# Patient Record
Sex: FEMALE | Race: BLACK OR AFRICAN AMERICAN | Employment: UNEMPLOYED | URBAN - METROPOLITAN AREA
[De-identification: names, ages, dates, MRNs, and addresses within clinical notes are randomized per-mention and may not be internally consistent; named-entity substitution may affect disease eponyms.]

---

## 2024-02-10 ENCOUNTER — APPOINTMENT (OUTPATIENT)
Dept: CT IMAGING | Age: 49
End: 2024-02-10
Attending: INTERNAL MEDICINE
Payer: COMMERCIAL

## 2024-02-10 ENCOUNTER — HOSPITAL ENCOUNTER (INPATIENT)
Age: 49
LOS: 6 days | Discharge: HOME | End: 2024-02-16
Attending: INTERNAL MEDICINE | Admitting: INTERNAL MEDICINE
Payer: COMMERCIAL

## 2024-02-10 DIAGNOSIS — S81.801A WOUND OF RIGHT LOWER EXTREMITY, INITIAL ENCOUNTER: Primary | ICD-10-CM

## 2024-02-10 DIAGNOSIS — S81.801D WOUND OF RIGHT LOWER EXTREMITY, SUBSEQUENT ENCOUNTER: ICD-10-CM

## 2024-02-10 PROBLEM — D64.9 ANEMIA: Status: ACTIVE | Noted: 2024-02-10

## 2024-02-10 PROBLEM — D64.9 SEVERE ANEMIA: Status: ACTIVE | Noted: 2024-02-10

## 2024-02-10 LAB
ABO + RH BLD: NORMAL
ALBUMIN SERPL-MCNC: 3.3 G/DL (ref 3.4–5)
ALBUMIN/GLOB SERPL: 1.1 {RATIO} (ref 1.1–2.2)
ALP SERPL-CCNC: 111 U/L (ref 40–129)
ALT SERPL-CCNC: 13 U/L (ref 10–40)
AMPHETAMINES UR QL SCN>1000 NG/ML: POSITIVE
ANION GAP SERPL CALCULATED.3IONS-SCNC: 11 MMOL/L (ref 3–16)
AST SERPL-CCNC: 20 U/L (ref 15–37)
BARBITURATES UR QL SCN>200 NG/ML: ABNORMAL
BENZODIAZ UR QL SCN>200 NG/ML: ABNORMAL
BILIRUB SERPL-MCNC: 0.3 MG/DL (ref 0–1)
BILIRUB UR QL STRIP.AUTO: NEGATIVE
BLD GP AB SCN SERPL QL: NORMAL
BUN SERPL-MCNC: 11 MG/DL (ref 7–20)
CALCIUM SERPL-MCNC: 8.1 MG/DL (ref 8.3–10.6)
CANNABINOIDS UR QL SCN>50 NG/ML: ABNORMAL
CHLORIDE SERPL-SCNC: 111 MMOL/L (ref 99–110)
CLARITY UR: CLEAR
CO2 SERPL-SCNC: 19 MMOL/L (ref 21–32)
COCAINE UR QL SCN: ABNORMAL
COLOR UR: YELLOW
CREAT SERPL-MCNC: 0.8 MG/DL (ref 0.6–1.1)
DEPRECATED RDW RBC AUTO: 17.5 % (ref 12.4–15.4)
DRUG SCREEN COMMENT UR-IMP: ABNORMAL
FENTANYL SCREEN, URINE: ABNORMAL
FOLATE SERPL-MCNC: 5.13 NG/ML (ref 4.78–24.2)
GFR SERPLBLD CREATININE-BSD FMLA CKD-EPI: >60 ML/MIN/{1.73_M2}
GLUCOSE SERPL-MCNC: 93 MG/DL (ref 70–99)
GLUCOSE UR STRIP.AUTO-MCNC: NEGATIVE MG/DL
HCT VFR BLD AUTO: 26.6 % (ref 36–48)
HGB BLD-MCNC: 8.7 G/DL (ref 12–16)
HGB UR QL STRIP.AUTO: NEGATIVE
IRON SATN MFR SERPL: 8 % (ref 15–50)
IRON SERPL-MCNC: 30 UG/DL (ref 37–145)
KETONES UR STRIP.AUTO-MCNC: NEGATIVE MG/DL
LEUKOCYTE ESTERASE UR QL STRIP.AUTO: NEGATIVE
MAGNESIUM SERPL-MCNC: 1.9 MG/DL (ref 1.8–2.4)
MCH RBC QN AUTO: 24.8 PG (ref 26–34)
MCHC RBC AUTO-ENTMCNC: 32.8 G/DL (ref 31–36)
MCV RBC AUTO: 75.5 FL (ref 80–100)
METHADONE UR QL SCN>300 NG/ML: ABNORMAL
NITRITE UR QL STRIP.AUTO: NEGATIVE
OPIATES UR QL SCN>300 NG/ML: ABNORMAL
OXYCODONE UR QL SCN: ABNORMAL
PCP UR QL SCN>25 NG/ML: ABNORMAL
PH UR STRIP.AUTO: 6.5 [PH] (ref 5–8)
PH UR STRIP: 6 [PH]
PLATELET # BLD AUTO: 337 K/UL (ref 135–450)
PMV BLD AUTO: 6.7 FL (ref 5–10.5)
POTASSIUM SERPL-SCNC: 3.5 MMOL/L (ref 3.5–5.1)
PROT SERPL-MCNC: 6.4 G/DL (ref 6.4–8.2)
PROT UR STRIP.AUTO-MCNC: NEGATIVE MG/DL
RBC # BLD AUTO: 3.52 M/UL (ref 4–5.2)
SODIUM SERPL-SCNC: 141 MMOL/L (ref 136–145)
SP GR UR STRIP.AUTO: 1.02 (ref 1–1.03)
TIBC SERPL-MCNC: 399 UG/DL (ref 260–445)
UA COMPLETE W REFLEX CULTURE PNL UR: NORMAL
UA DIPSTICK W REFLEX MICRO PNL UR: NORMAL
URN SPEC COLLECT METH UR: NORMAL
UROBILINOGEN UR STRIP-ACNC: 0.2 E.U./DL
VIT B12 SERPL-MCNC: 249 PG/ML (ref 211–911)
WBC # BLD AUTO: 6 K/UL (ref 4–11)

## 2024-02-10 PROCEDURE — 86850 RBC ANTIBODY SCREEN: CPT

## 2024-02-10 PROCEDURE — 86901 BLOOD TYPING SEROLOGIC RH(D): CPT

## 2024-02-10 PROCEDURE — 85027 COMPLETE CBC AUTOMATED: CPT

## 2024-02-10 PROCEDURE — 80307 DRUG TEST PRSMV CHEM ANLYZR: CPT

## 2024-02-10 PROCEDURE — 80053 COMPREHEN METABOLIC PANEL: CPT

## 2024-02-10 PROCEDURE — 82607 VITAMIN B-12: CPT

## 2024-02-10 PROCEDURE — 83550 IRON BINDING TEST: CPT

## 2024-02-10 PROCEDURE — G0378 HOSPITAL OBSERVATION PER HR: HCPCS

## 2024-02-10 PROCEDURE — 36415 COLL VENOUS BLD VENIPUNCTURE: CPT

## 2024-02-10 PROCEDURE — 73700 CT LOWER EXTREMITY W/O DYE: CPT

## 2024-02-10 PROCEDURE — 6370000000 HC RX 637 (ALT 250 FOR IP): Performed by: INTERNAL MEDICINE

## 2024-02-10 PROCEDURE — 99222 1ST HOSP IP/OBS MODERATE 55: CPT | Performed by: SURGERY

## 2024-02-10 PROCEDURE — 2580000003 HC RX 258: Performed by: INTERNAL MEDICINE

## 2024-02-10 PROCEDURE — 83540 ASSAY OF IRON: CPT

## 2024-02-10 PROCEDURE — 1200000000 HC SEMI PRIVATE

## 2024-02-10 PROCEDURE — 86900 BLOOD TYPING SEROLOGIC ABO: CPT

## 2024-02-10 PROCEDURE — G0379 DIRECT REFER HOSPITAL OBSERV: HCPCS

## 2024-02-10 PROCEDURE — 2500000003 HC RX 250 WO HCPCS

## 2024-02-10 PROCEDURE — 81003 URINALYSIS AUTO W/O SCOPE: CPT

## 2024-02-10 PROCEDURE — 6370000000 HC RX 637 (ALT 250 FOR IP)

## 2024-02-10 PROCEDURE — 82746 ASSAY OF FOLIC ACID SERUM: CPT

## 2024-02-10 PROCEDURE — 83735 ASSAY OF MAGNESIUM: CPT

## 2024-02-10 RX ORDER — POTASSIUM CHLORIDE 7.45 MG/ML
10 INJECTION INTRAVENOUS PRN
Status: DISCONTINUED | OUTPATIENT
Start: 2024-02-10 | End: 2024-02-16 | Stop reason: HOSPADM

## 2024-02-10 RX ORDER — ACETAMINOPHEN 650 MG/1
650 SUPPOSITORY RECTAL EVERY 6 HOURS PRN
Status: DISCONTINUED | OUTPATIENT
Start: 2024-02-10 | End: 2024-02-16 | Stop reason: HOSPADM

## 2024-02-10 RX ORDER — MAGNESIUM SULFATE IN WATER 40 MG/ML
2000 INJECTION, SOLUTION INTRAVENOUS PRN
Status: DISCONTINUED | OUTPATIENT
Start: 2024-02-10 | End: 2024-02-16 | Stop reason: HOSPADM

## 2024-02-10 RX ORDER — PANTOPRAZOLE SODIUM 40 MG/1
40 TABLET, DELAYED RELEASE ORAL
Status: DISCONTINUED | OUTPATIENT
Start: 2024-02-11 | End: 2024-02-16 | Stop reason: HOSPADM

## 2024-02-10 RX ORDER — ENOXAPARIN SODIUM 100 MG/ML
40 INJECTION SUBCUTANEOUS DAILY
Status: DISCONTINUED | OUTPATIENT
Start: 2024-02-11 | End: 2024-02-16 | Stop reason: HOSPADM

## 2024-02-10 RX ORDER — POTASSIUM CHLORIDE 20 MEQ/1
40 TABLET, EXTENDED RELEASE ORAL PRN
Status: DISCONTINUED | OUTPATIENT
Start: 2024-02-10 | End: 2024-02-16 | Stop reason: HOSPADM

## 2024-02-10 RX ORDER — ONDANSETRON 2 MG/ML
4 INJECTION INTRAMUSCULAR; INTRAVENOUS EVERY 6 HOURS PRN
Status: DISCONTINUED | OUTPATIENT
Start: 2024-02-10 | End: 2024-02-16 | Stop reason: HOSPADM

## 2024-02-10 RX ORDER — SODIUM CHLORIDE 0.9 % (FLUSH) 0.9 %
5-40 SYRINGE (ML) INJECTION EVERY 12 HOURS SCHEDULED
Status: DISCONTINUED | OUTPATIENT
Start: 2024-02-10 | End: 2024-02-16 | Stop reason: HOSPADM

## 2024-02-10 RX ORDER — ACETAMINOPHEN 325 MG/1
650 TABLET ORAL EVERY 6 HOURS PRN
Status: DISCONTINUED | OUTPATIENT
Start: 2024-02-10 | End: 2024-02-16 | Stop reason: HOSPADM

## 2024-02-10 RX ORDER — SODIUM CHLORIDE 0.9 % (FLUSH) 0.9 %
5-40 SYRINGE (ML) INJECTION PRN
Status: DISCONTINUED | OUTPATIENT
Start: 2024-02-10 | End: 2024-02-16 | Stop reason: HOSPADM

## 2024-02-10 RX ORDER — POLYETHYLENE GLYCOL 3350 17 G/17G
17 POWDER, FOR SOLUTION ORAL DAILY PRN
Status: DISCONTINUED | OUTPATIENT
Start: 2024-02-10 | End: 2024-02-16 | Stop reason: HOSPADM

## 2024-02-10 RX ORDER — ONDANSETRON 4 MG/1
4 TABLET, ORALLY DISINTEGRATING ORAL EVERY 8 HOURS PRN
Status: DISCONTINUED | OUTPATIENT
Start: 2024-02-10 | End: 2024-02-16 | Stop reason: HOSPADM

## 2024-02-10 RX ORDER — LIDOCAINE HYDROCHLORIDE AND EPINEPHRINE 10; 10 MG/ML; UG/ML
20 INJECTION, SOLUTION INFILTRATION; PERINEURAL ONCE
Status: COMPLETED | OUTPATIENT
Start: 2024-02-10 | End: 2024-02-10

## 2024-02-10 RX ORDER — CEPHALEXIN 500 MG/1
500 CAPSULE ORAL ONCE
Status: DISCONTINUED | OUTPATIENT
Start: 2024-02-10 | End: 2024-02-10

## 2024-02-10 RX ORDER — CEPHALEXIN 500 MG/1
500 CAPSULE ORAL EVERY 6 HOURS SCHEDULED
Status: DISCONTINUED | OUTPATIENT
Start: 2024-02-10 | End: 2024-02-11

## 2024-02-10 RX ORDER — SODIUM CHLORIDE 9 MG/ML
INJECTION, SOLUTION INTRAVENOUS PRN
Status: DISCONTINUED | OUTPATIENT
Start: 2024-02-10 | End: 2024-02-16 | Stop reason: HOSPADM

## 2024-02-10 RX ADMIN — CEPHALEXIN 500 MG: 500 CAPSULE ORAL at 16:58

## 2024-02-10 RX ADMIN — LIDOCAINE HYDROCHLORIDE,EPINEPHRINE BITARTRATE 20 ML: 10; .01 INJECTION, SOLUTION INFILTRATION; PERINEURAL at 18:14

## 2024-02-10 RX ADMIN — SODIUM CHLORIDE, PRESERVATIVE FREE 10 ML: 5 INJECTION INTRAVENOUS at 21:16

## 2024-02-10 RX ADMIN — ONDANSETRON 4 MG: 4 TABLET, ORALLY DISINTEGRATING ORAL at 16:58

## 2024-02-10 RX ADMIN — ACETAMINOPHEN 650 MG: 325 TABLET ORAL at 23:56

## 2024-02-10 RX ADMIN — Medication: at 18:14

## 2024-02-10 RX ADMIN — CEPHALEXIN 500 MG: 500 CAPSULE ORAL at 23:42

## 2024-02-10 RX ADMIN — ACETAMINOPHEN 650 MG: 325 TABLET ORAL at 16:56

## 2024-02-10 ASSESSMENT — PAIN DESCRIPTION - ORIENTATION: ORIENTATION: RIGHT

## 2024-02-10 ASSESSMENT — PAIN SCALES - GENERAL
PAINLEVEL_OUTOF10: 0
PAINLEVEL_OUTOF10: 0
PAINLEVEL_OUTOF10: 3

## 2024-02-10 ASSESSMENT — PAIN DESCRIPTION - DESCRIPTORS: DESCRIPTORS: ACHING

## 2024-02-10 ASSESSMENT — PAIN DESCRIPTION - FREQUENCY: FREQUENCY: INTERMITTENT

## 2024-02-10 ASSESSMENT — PAIN DESCRIPTION - LOCATION: LOCATION: LEG

## 2024-02-10 ASSESSMENT — PAIN DESCRIPTION - PAIN TYPE: TYPE: ACUTE PAIN

## 2024-02-10 ASSESSMENT — PAIN - FUNCTIONAL ASSESSMENT: PAIN_FUNCTIONAL_ASSESSMENT: ACTIVITIES ARE NOT PREVENTED

## 2024-02-10 ASSESSMENT — PAIN DESCRIPTION - ONSET: ONSET: GRADUAL

## 2024-02-10 NOTE — PROGRESS NOTES
4 Eyes Skin Assessment     NAME:  Guero Parker  YOB: 1975  MEDICAL RECORD NUMBER:  [id number]    The patient is being assessed for  Admission    I agree that at least one RN has performed a thorough Head to Toe Skin Assessment on the patient. ALL assessment sites listed below have been assessed.      Areas assessed by both nurses:    Head, Face, Ears, Shoulders, Back, Chest, Arms, Elbows, Hands, Sacrum. Buttock, Coccyx, Ischium, and Legs. Feet and Heels        Does the Patient have a Wound? Yes wound(s) were present on assessment. LDA wound assessment was Initiated and completed by RN       Jared Prevention initiated by RN: Yes  Wound Care Orders initiated by RN: Yes    Pressure Injury (Stage 3,4, Unstageable, DTI, NWPT, and Complex wounds) if present, place Wound referral order by RN under : Yes    New Ostomies, if present place, Ostomy referral order under : Yes     Nurse 1 eSignature: Electronically signed by Sushila Navarro RN on 2/10/24 at 11:53 AM EST    **SHARE this note so that the co-signing nurse can place an eSignature**    Nurse 2 eSignature: Electronically signed by Marilia Bolden RN on 2/10/24 at 11:57 AM EST

## 2024-02-10 NOTE — H&P
Progressively got worse.  Has a feeling of something crawling inside it and has severe pain.  Has been using.  Patient was unable to see PCP or get into wound care clinic  No fever or chills.  No trauma     Review of Systems:        Pertinent positives and negatives discussed in HPI     Objective:   No intake or output data in the 24 hours ending 02/10/24 1318   Vitals:   Vitals:    02/10/24 1132   BP: 129/80   Pulse: 90   Resp: 19   Temp: 97.3 °F (36.3 °C)   TempSrc: Oral   SpO2: 98%       Medications Prior to Admission     Prior to Admission medications    Medication Sig Start Date End Date Taking? Authorizing Provider   pantoprazole (PROTONIX) 20 MG tablet Take 40 mg by mouth daily     Provider, MD Leonor       Physical Exam:    Physical Exam     General: NAD  Eyes: EOMI  ENT: neck supple  Cardiovascular: Regular rate.  Respiratory: Clear to auscultation  Gastrointestinal: Soft, non tender, ostomy +  Genitourinary: no suprapubic tenderness  Musculoskeletal: No edema  Skin: 1 cm x 1 cm opening present on the lower leg with surrounding necrotic area and induration  Neuro: Alert.  Psych: Mood appropriate.       Past Medical History:   PMHx   Past Medical History:   Diagnosis Date    Colostomy in place (HCC)     Essential hypertension 9/21/2015    History of blood transfusion     Psychiatric problem     depression, anxiety, bipolar     PSHX:  has a past surgical history that includes Abdomen surgery; Appendectomy; Colonoscopy; Dilatation, esophagus; and Tonsillectomy.  Allergies: No Known Allergies  Fam HX: family history includes Arthritis in her mother; Asthma in her mother; Kidney Disease in her father.  Soc HX:   Social History     Socioeconomic History    Marital status:    Tobacco Use    Smoking status: Every Day     Current packs/day: 1.00     Types: Cigarettes   Substance and Sexual Activity    Alcohol use: No    Drug use: No    Sexual activity: Never       Medications:   Medications:    sodium

## 2024-02-10 NOTE — CONSULTS
File Prior to Encounter   Medication Sig Dispense Refill    pantoprazole (PROTONIX) 20 MG tablet Take 2 tablets by mouth daily         Current Meds  sodium chloride flush 0.9 % injection 5-40 mL, 2 times per day  sodium chloride flush 0.9 % injection 5-40 mL, PRN  0.9 % sodium chloride infusion, PRN  potassium chloride (KLOR-CON M) extended release tablet 40 mEq, PRN   Or  potassium bicarb-citric acid (EFFER-K) effervescent tablet 40 mEq, PRN   Or  potassium chloride 10 mEq/100 mL IVPB (Peripheral Line), PRN  magnesium sulfate 2000 mg in 50 mL IVPB premix, PRN  [START ON 2/11/2024] enoxaparin (LOVENOX) injection 40 mg, Daily  ondansetron (ZOFRAN-ODT) disintegrating tablet 4 mg, Q8H PRN   Or  ondansetron (ZOFRAN) injection 4 mg, Q6H PRN  polyethylene glycol (GLYCOLAX) packet 17 g, Daily PRN  acetaminophen (TYLENOL) tablet 650 mg, Q6H PRN   Or  acetaminophen (TYLENOL) suppository 650 mg, Q6H PRN  [START ON 2/11/2024] pantoprazole (PROTONIX) tablet 40 mg, QAM AC        Family History:   Family History   Problem Relation Age of Onset    Arthritis Mother     Asthma Mother     Kidney Disease Father        Social History:   TOBACCO:   reports that she has been smoking cigarettes. She does not have any smokeless tobacco history on file.  ETOH:   reports no history of alcohol use.  DRUGS:   reports no history of drug use.    Review of Systems:   A 14 point review of systems was conducted, significant findings as noted in HPI. All other systems negative.     Physical exam:    Vitals:    02/10/24 1132 02/10/24 1430   BP: 129/80    Pulse: 90    Resp: 19    Temp: 97.3 °F (36.3 °C)    TempSrc: Oral    SpO2: 98%    Weight:  88.5 kg (195 lb 1.7 oz)       General appearance: Alert, no acute distress, grooming appropriate  Eyes: No scleral icterus, EOM grossly intact  Neck: Trachea midline, no JVD  Chest/Lungs: Normal effort with no accessory muscle use on RA  Cardiovascular: RRR, well perfused  Abdomen: Soft, non-tender,  mesh, bipolar disorder, hypertension and GERD who presents with severe anemia.  General surgery was consulted for necrotic wound on her right calf.     - Will consent for bedside debridement  - Start oral Keflex for cellulitis  - Rest of care per primary      The patient was seen by senior resident and discussed with Dr. Jt Ma.      Tatiana Herrera DO  02/10/24  3:27 PM

## 2024-02-10 NOTE — PROGRESS NOTES
Pt to room 6302 from outside hospital. Pt has wound noted to RLE. Pt states the wound has been there \"for awhile\". She also states that a parasite is living inside the wound and that it gets mad and starts biting her if she coverers the wound. The parasite gave birth last week per patient. MD notified of patients arrival, VSS, awaiting orders.

## 2024-02-10 NOTE — PROCEDURES
General Surgery     Patient: Guero Parker       Excisional Debridement of Right leg wound     Indication for Procedure:     Pre-Procedure:   Written informed consent was provided by Patient after discussion of risks and benefits of the procedure, including the answering of all questions to the patient's stated satisfaction.     The image below was taken immediately prior to debridement:      Procedure:  Patient was identified by stated name, Time-out was called immediately prior to positioning and all present were in agreement. The skin was prepped and draped in the usual sterile manner, 20 ml of 1% lidocaine with epinephrine was used to anesthetize the wound edges for increased pain control. An #10 blade scalpel was used to excise the non-viable tissue. No dishwater appearing fluid or purulent fluid appreciated during debridement. Additional fibrotic tissue was then excised down to fascia until the wound bed was composed of viable tissue, evidenced by the presence of bleeding on all surfaces of the wound. Wound was 3cm in length and 4cm in width after debridement.     Hemostasis achieved with silver nitrate applicators and surgicel packing. Removed after holding pressure for 5 minutes. Quarter inch iodoform gauze was then packed into the wound bed, followed by covering of the area with a 4x4 and tape    EBL 20 ml    Patient tolerated the procedure well.     Image below is immediately post-debridement:          Jame Oneill DO, PGY1, General Surgery  02/10/24  6:39 PM  Robert  Pager: [phone]

## 2024-02-11 ENCOUNTER — APPOINTMENT (OUTPATIENT)
Dept: GENERAL RADIOLOGY | Age: 49
End: 2024-02-11
Attending: INTERNAL MEDICINE
Payer: COMMERCIAL

## 2024-02-11 PROBLEM — S81.831A: Status: ACTIVE | Noted: 2024-02-11

## 2024-02-11 LAB
ALBUMIN SERPL-MCNC: 3.3 G/DL (ref 3.4–5)
ALBUMIN/GLOB SERPL: 1.1 {RATIO} (ref 1.1–2.2)
ALP SERPL-CCNC: 109 U/L (ref 40–129)
ALT SERPL-CCNC: 11 U/L (ref 10–40)
ANION GAP SERPL CALCULATED.3IONS-SCNC: 9 MMOL/L (ref 3–16)
AST SERPL-CCNC: 16 U/L (ref 15–37)
BILIRUB SERPL-MCNC: 0.3 MG/DL (ref 0–1)
BUN SERPL-MCNC: 8 MG/DL (ref 7–20)
CALCIUM SERPL-MCNC: 8.4 MG/DL (ref 8.3–10.6)
CHLORIDE SERPL-SCNC: 107 MMOL/L (ref 99–110)
CO2 SERPL-SCNC: 23 MMOL/L (ref 21–32)
CREAT SERPL-MCNC: 0.8 MG/DL (ref 0.6–1.1)
DEPRECATED RDW RBC AUTO: 18.1 % (ref 12.4–15.4)
GFR SERPLBLD CREATININE-BSD FMLA CKD-EPI: >60 ML/MIN/{1.73_M2}
GLUCOSE SERPL-MCNC: 85 MG/DL (ref 70–99)
HCT VFR BLD AUTO: 27.8 % (ref 36–48)
HGB BLD-MCNC: 8.9 G/DL (ref 12–16)
MCH RBC QN AUTO: 24.3 PG (ref 26–34)
MCHC RBC AUTO-ENTMCNC: 31.8 G/DL (ref 31–36)
MCV RBC AUTO: 76.3 FL (ref 80–100)
PLATELET # BLD AUTO: 328 K/UL (ref 135–450)
PMV BLD AUTO: 6.7 FL (ref 5–10.5)
POTASSIUM SERPL-SCNC: 3.6 MMOL/L (ref 3.5–5.1)
PROT SERPL-MCNC: 6.4 G/DL (ref 6.4–8.2)
RBC # BLD AUTO: 3.65 M/UL (ref 4–5.2)
SODIUM SERPL-SCNC: 139 MMOL/L (ref 136–145)
WBC # BLD AUTO: 6.3 K/UL (ref 4–11)

## 2024-02-11 PROCEDURE — 71045 X-RAY EXAM CHEST 1 VIEW: CPT

## 2024-02-11 PROCEDURE — 6370000000 HC RX 637 (ALT 250 FOR IP)

## 2024-02-11 PROCEDURE — 0JBN0ZZ EXCISION OF RIGHT LOWER LEG SUBCUTANEOUS TISSUE AND FASCIA, OPEN APPROACH: ICD-10-PCS

## 2024-02-11 PROCEDURE — 85027 COMPLETE CBC AUTOMATED: CPT

## 2024-02-11 PROCEDURE — 6370000000 HC RX 637 (ALT 250 FOR IP): Performed by: NURSE PRACTITIONER

## 2024-02-11 PROCEDURE — 36415 COLL VENOUS BLD VENIPUNCTURE: CPT

## 2024-02-11 PROCEDURE — 80053 COMPREHEN METABOLIC PANEL: CPT

## 2024-02-11 PROCEDURE — 6360000002 HC RX W HCPCS: Performed by: INTERNAL MEDICINE

## 2024-02-11 PROCEDURE — 99232 SBSQ HOSP IP/OBS MODERATE 35: CPT | Performed by: SURGERY

## 2024-02-11 PROCEDURE — 6370000000 HC RX 637 (ALT 250 FOR IP): Performed by: INTERNAL MEDICINE

## 2024-02-11 PROCEDURE — 1200000000 HC SEMI PRIVATE

## 2024-02-11 PROCEDURE — 2580000003 HC RX 258: Performed by: INTERNAL MEDICINE

## 2024-02-11 RX ORDER — OXYCODONE HYDROCHLORIDE 5 MG/1
2.5 TABLET ORAL EVERY 4 HOURS PRN
Status: DISCONTINUED | OUTPATIENT
Start: 2024-02-11 | End: 2024-02-12

## 2024-02-11 RX ORDER — SODIUM CHLORIDE, SODIUM LACTATE, POTASSIUM CHLORIDE, CALCIUM CHLORIDE 600; 310; 30; 20 MG/100ML; MG/100ML; MG/100ML; MG/100ML
INJECTION, SOLUTION INTRAVENOUS CONTINUOUS
Status: DISCONTINUED | OUTPATIENT
Start: 2024-02-12 | End: 2024-02-13

## 2024-02-11 RX ORDER — OXYCODONE HYDROCHLORIDE 5 MG/1
5 TABLET ORAL EVERY 4 HOURS PRN
Status: DISCONTINUED | OUTPATIENT
Start: 2024-02-11 | End: 2024-02-12

## 2024-02-11 RX ORDER — OXYCODONE HYDROCHLORIDE 5 MG/1
5 TABLET ORAL EVERY 4 HOURS PRN
Status: COMPLETED | OUTPATIENT
Start: 2024-02-11 | End: 2024-02-11

## 2024-02-11 RX ORDER — FERROUS SULFATE 325(65) MG
325 TABLET ORAL 2 TIMES DAILY WITH MEALS
Status: DISCONTINUED | OUTPATIENT
Start: 2024-02-11 | End: 2024-02-16 | Stop reason: HOSPADM

## 2024-02-11 RX ADMIN — OXYCODONE 5 MG: 5 TABLET ORAL at 00:21

## 2024-02-11 RX ADMIN — SODIUM CHLORIDE: 9 INJECTION, SOLUTION INTRAVENOUS at 12:58

## 2024-02-11 RX ADMIN — FERROUS SULFATE TAB 325 MG (65 MG ELEMENTAL FE) 325 MG: 325 (65 FE) TAB at 12:46

## 2024-02-11 RX ADMIN — SODIUM CHLORIDE: 9 INJECTION, SOLUTION INTRAVENOUS at 23:59

## 2024-02-11 RX ADMIN — AMPICILLIN SODIUM AND SULBACTAM SODIUM 3000 MG: 2; 1 INJECTION, POWDER, FOR SOLUTION INTRAMUSCULAR; INTRAVENOUS at 18:41

## 2024-02-11 RX ADMIN — OXYCODONE 5 MG: 5 TABLET ORAL at 05:31

## 2024-02-11 RX ADMIN — AMPICILLIN SODIUM AND SULBACTAM SODIUM 3000 MG: 2; 1 INJECTION, POWDER, FOR SOLUTION INTRAMUSCULAR; INTRAVENOUS at 13:00

## 2024-02-11 RX ADMIN — SODIUM CHLORIDE, PRESERVATIVE FREE 10 ML: 5 INJECTION INTRAVENOUS at 20:19

## 2024-02-11 RX ADMIN — OXYCODONE 5 MG: 5 TABLET ORAL at 20:19

## 2024-02-11 RX ADMIN — OXYCODONE 5 MG: 5 TABLET ORAL at 12:46

## 2024-02-11 RX ADMIN — PANTOPRAZOLE SODIUM 40 MG: 40 TABLET, DELAYED RELEASE ORAL at 05:31

## 2024-02-11 RX ADMIN — CEPHALEXIN 500 MG: 500 CAPSULE ORAL at 05:31

## 2024-02-11 RX ADMIN — ENOXAPARIN SODIUM 40 MG: 100 INJECTION SUBCUTANEOUS at 08:34

## 2024-02-11 RX ADMIN — SODIUM CHLORIDE, PRESERVATIVE FREE 10 ML: 5 INJECTION INTRAVENOUS at 12:47

## 2024-02-11 RX ADMIN — FERROUS SULFATE TAB 325 MG (65 MG ELEMENTAL FE) 325 MG: 325 (65 FE) TAB at 18:41

## 2024-02-11 ASSESSMENT — PAIN SCALES - GENERAL
PAINLEVEL_OUTOF10: 9
PAINLEVEL_OUTOF10: 8
PAINLEVEL_OUTOF10: 8
PAINLEVEL_OUTOF10: 7
PAINLEVEL_OUTOF10: 0
PAINLEVEL_OUTOF10: 2
PAINLEVEL_OUTOF10: 0

## 2024-02-11 ASSESSMENT — PAIN DESCRIPTION - LOCATION
LOCATION: LEG

## 2024-02-11 ASSESSMENT — PAIN DESCRIPTION - DESCRIPTORS
DESCRIPTORS: ACHING;DISCOMFORT
DESCRIPTORS: ACHING;DISCOMFORT
DESCRIPTORS: ACHING
DESCRIPTORS: BURNING

## 2024-02-11 ASSESSMENT — PAIN - FUNCTIONAL ASSESSMENT
PAIN_FUNCTIONAL_ASSESSMENT: ACTIVITIES ARE NOT PREVENTED

## 2024-02-11 ASSESSMENT — PAIN DESCRIPTION - ORIENTATION
ORIENTATION: RIGHT

## 2024-02-11 ASSESSMENT — PAIN SCALES - WONG BAKER
WONGBAKER_NUMERICALRESPONSE: 0
WONGBAKER_NUMERICALRESPONSE: 0

## 2024-02-11 ASSESSMENT — PAIN DESCRIPTION - ONSET: ONSET: ON-GOING

## 2024-02-11 ASSESSMENT — PAIN DESCRIPTION - PAIN TYPE: TYPE: SURGICAL PAIN;ACUTE PAIN

## 2024-02-11 ASSESSMENT — PAIN DESCRIPTION - FREQUENCY: FREQUENCY: CONTINUOUS

## 2024-02-11 NOTE — PROGRESS NOTES
PRE-OP NOTE  Department of Surgery  Resident Note     Procedure: right leg wound debridement    Consent: Informed consent signed    Labs      Recent Labs     02/10/24  1247 02/11/24  0812   WBC 6.0 6.3   HGB 8.7* 8.9*   HCT 26.6* 27.8*   MCV 75.5* 76.3*    328        Recent Labs     02/10/24  1247 02/11/24  0812    139   K 3.5 3.6   * 107   CO2 19* 23   BUN 11 8   CREATININE 0.8 0.8        Recent Labs     02/10/24  1247 02/11/24  0812   AST 20 16   ALT 13 11   BILITOT 0.3 0.3   ALKPHOS 111 109      No results for input(s): \"LIPASE\", \"AMYLASE\" in the last 72 hours.     Recent Labs     02/10/24  1247 02/11/24  0812   PROT 6.4 6.4      No results for input(s): \"CKTOTAL\", \"CKMB\", \"CKMBINDEX\", \"TROPONINI\" in the last 72 hours.    CXR: Pending  EKG: Pending    Orders:   Diet: NPO after midnight,  IVF @   Pre op Medications:  unasyn scheduled  Labs to be drawn: Type and Screen, Renal panel, CBC, INR  EKG  CXR   Anesthesia to see patient  Pregnancy test    Will Rivera DO  PGY2, General Surgery  02/11/24  2:31 PM  [employer]  Pager: [phone]

## 2024-02-11 NOTE — PROGRESS NOTES
General Surgery   Daily Progress Note  Patient: Guero Parker      CC: right leg wound     SUBJECTIVE:   Patient rested well overnight. Denies leg pain.     ROS:   A 14 point review of systems was conducted, significant findings as noted above. All other systems negative.    OBJECTIVE:    PHYSICAL EXAM:    Vitals:    02/11/24 0021 02/11/24 0051 02/11/24 0442 02/11/24 0531   BP: 127/71  125/84    Pulse: 82  72    Resp: 18 16 18 18   Temp: 97.8 °F (36.6 °C)  98.4 °F (36.9 °C)    TempSrc: Oral  Oral    SpO2: 99%  99%    Weight:           General appearance: Alert, no acute distress, grooming appropriate  Eyes: No scleral icterus, EOM grossly intact  Neck: Trachea midline, no JVD  Chest/Lungs: Normal effort with no accessory muscle use on RA  Cardiovascular: RRR, well perfused  Abdomen: Soft, non-tender, non-distended, no rebound, guarding, or rigidity.  Skin: Warm and dry, no rashes. Several wounds  Extremities: No edema, no cyanosis. RLE medial calf with 4-cm diameter wound without surrounding warmth or erythema however persistent induration. no purulence expressed, no crepitus [see image]. Several excoriations along leg.   Neuro: A&Ox3, no focal deficits, sensation intact    LABS:   Recent Labs     02/10/24  1247   WBC 6.0   HGB 8.7*   HCT 26.6*   MCV 75.5*           Recent Labs     02/10/24  1247      K 3.5   *   CO2 19*   BUN 11   CREATININE 0.8        Recent Labs     02/10/24  1247   AST 20   ALT 13   BILITOT 0.3   ALKPHOS 111      No results for input(s): \"LIPASE\", \"AMYLASE\" in the last 72 hours.     Recent Labs     02/10/24  1247   PROT 6.4      No results for input(s): \"CKTOTAL\", \"CKMB\", \"CKMBINDEX\", \"TROPONINI\" in the last 72 hours.      ASSESSMENT & PLAN:   This is a 48 y.o. female with right leg wound s/p bedside debridement 2/10/24.    Some grayish tissue at posterior border of wound edge, may benefit from repeat debridement.   Continue antibiotics  Remainder of care per primary.

## 2024-02-11 NOTE — PLAN OF CARE
Problem: Pain  Goal: Verbalizes/displays adequate comfort level or baseline comfort level  Outcome: Progressing  Flowsheets (Taken 2/11/2024 0050)  Verbalizes/displays adequate comfort level or baseline comfort level:   Encourage patient to monitor pain and request assistance   Assess pain using appropriate pain scale   Administer analgesics based on type and severity of pain and evaluate response   Implement non-pharmacological measures as appropriate and evaluate response   Notify Licensed Independent Practitioner if interventions unsuccessful or patient reports new pain  Note: Patient is having pain on the right calf MD was informed since tylenol is not working for her. PRN Oxycodone was ordered and given. Keep patient pain free as much as possible and continue to monitor pain using appropriate pain scale.      Problem: Skin/Tissue Integrity  Goal: Absence of new skin breakdown  Description: 1.  Monitor for areas of redness and/or skin breakdown  2.  Assess vascular access sites hourly  3.  Every 4-6 hours minimum:  Change oxygen saturation probe site  4.  Every 4-6 hours:  If on nasal continuous positive airway pressure, respiratory therapy assess nares and determine need for appliance change or resting period.  Outcome: Progressing  Note: With right abdomen colostomy bag in place, site with mild redness. Back is good no other skin issue. Right Calf wound is covered with dressing.      Problem: Discharge Planning  Goal: Discharge to home or other facility with appropriate resources  Outcome: Progressing

## 2024-02-11 NOTE — PROGRESS NOTES
V2.0    [facility] Progress Note      Name:  Guero Parker /Age/Sex: 1975  (48 y.o. female)   MRN & CSN:  [id number] & [id number] Encounter Date/Time: 2024 9:29 AM EST   Location:  [facility]- PCP: Lydia Grider PA-C     Attending:Sunita Renee MD       Hospital Day: 2    Assessment and Recommendations   Guero Parker is a 48 y.o. female with pmh of multiple surgeries for hernia, bipolar disorder, GERD who was transferred from outside hospital for anemia and leg wound.  Patient's workup revealed iron deficiency anemia.  Surgery was consulted for the right lower leg wound underwent bedside debridement with      Plan:     Wound on the right lower leg- For OR tomorrow  --S/p debridement on 2/10/2024  -Surgery consulted appreciated  -Unasyn day 1  -CT tibia shows ulcer with soft tissue swelling no involvement of bone  -wound c/s and biopsy not sent     Anemia-iron deficient  -Per transfer center patient's hemoglobin was 6.6 on admission.  Repeat hemoglobin 8.6  -Start iron supplement       Ileostomy  -Unclear why patient had new extremity.  Patient does not know she states that because of her hernia surgery repair  -Ileostomy care.  Patient does not have supplies and has been using duct tape to tape the back to     Past history of bipolar disorder  -At present not on medication     GERD  -On PPI     Tobacco abuse  -Patient states she smokes off-and-on, declines nicotine patch    Polysubstance abuse  - tox screen + for amphetamine    Diet ADULT DIET; Regular   DVT Prophylaxis [x] Lovenox, []  Heparin, [] SCDs, [] Ambulation,  [] Eliquis, [] Xarelto  [] Coumadin   Code Status Full Code   Disposition From:   Expected Disposition: home  Estimated Date of Discharge: 2-3 days  Patient requires continued admission due to infected wound   Surrogate Decision Maker/ POA       Personally reviewed Lab Studies and Imaging     Discussed management of the case with Pharmacy who recommended : abx switched to

## 2024-02-12 ENCOUNTER — ANESTHESIA EVENT (OUTPATIENT)
Dept: OPERATING ROOM | Age: 49
End: 2024-02-12
Payer: COMMERCIAL

## 2024-02-12 ENCOUNTER — ANESTHESIA (OUTPATIENT)
Dept: OPERATING ROOM | Age: 49
End: 2024-02-12
Payer: COMMERCIAL

## 2024-02-12 LAB
ALBUMIN SERPL-MCNC: 3.1 G/DL (ref 3.4–5)
ALBUMIN/GLOB SERPL: 1.1 {RATIO} (ref 1.1–2.2)
ALP SERPL-CCNC: 105 U/L (ref 40–129)
ALT SERPL-CCNC: 9 U/L (ref 10–40)
ANION GAP SERPL CALCULATED.3IONS-SCNC: 8 MMOL/L (ref 3–16)
AST SERPL-CCNC: 12 U/L (ref 15–37)
BILIRUB SERPL-MCNC: <0.2 MG/DL (ref 0–1)
BUN SERPL-MCNC: 7 MG/DL (ref 7–20)
CALCIUM SERPL-MCNC: 8.3 MG/DL (ref 8.3–10.6)
CHLORIDE SERPL-SCNC: 108 MMOL/L (ref 99–110)
CO2 SERPL-SCNC: 24 MMOL/L (ref 21–32)
CREAT SERPL-MCNC: 0.8 MG/DL (ref 0.6–1.1)
DEPRECATED RDW RBC AUTO: 17.9 % (ref 12.4–15.4)
EKG ATRIAL RATE: 69 BPM
EKG DIAGNOSIS: NORMAL
EKG P AXIS: 22 DEGREES
EKG P-R INTERVAL: 144 MS
EKG Q-T INTERVAL: 410 MS
EKG QRS DURATION: 86 MS
EKG QTC CALCULATION (BAZETT): 439 MS
EKG R AXIS: 69 DEGREES
EKG T AXIS: 56 DEGREES
EKG VENTRICULAR RATE: 69 BPM
GFR SERPLBLD CREATININE-BSD FMLA CKD-EPI: >60 ML/MIN/{1.73_M2}
GLUCOSE SERPL-MCNC: 84 MG/DL (ref 70–99)
HCG UR QL: NEGATIVE
HCT VFR BLD AUTO: 28.4 % (ref 36–48)
HGB BLD-MCNC: 9 G/DL (ref 12–16)
INR PPP: 0.98 (ref 0.84–1.16)
MAGNESIUM SERPL-MCNC: 1.8 MG/DL (ref 1.8–2.4)
MCH RBC QN AUTO: 24 PG (ref 26–34)
MCHC RBC AUTO-ENTMCNC: 31.7 G/DL (ref 31–36)
MCV RBC AUTO: 75.7 FL (ref 80–100)
PHOSPHATE SERPL-MCNC: 2.1 MG/DL (ref 2.5–4.9)
PLATELET # BLD AUTO: 316 K/UL (ref 135–450)
PMV BLD AUTO: 6.6 FL (ref 5–10.5)
POTASSIUM SERPL-SCNC: 3.5 MMOL/L (ref 3.5–5.1)
PROT SERPL-MCNC: 6 G/DL (ref 6.4–8.2)
PROTHROMBIN TIME: 13 SEC (ref 11.5–14.8)
RBC # BLD AUTO: 3.75 M/UL (ref 4–5.2)
SODIUM SERPL-SCNC: 140 MMOL/L (ref 136–145)
WBC # BLD AUTO: 5.9 K/UL (ref 4–11)

## 2024-02-12 PROCEDURE — 88305 TISSUE EXAM BY PATHOLOGIST: CPT

## 2024-02-12 PROCEDURE — 0KBS0ZZ EXCISION OF RIGHT LOWER LEG MUSCLE, OPEN APPROACH: ICD-10-PCS | Performed by: SURGERY

## 2024-02-12 PROCEDURE — 2580000003 HC RX 258

## 2024-02-12 PROCEDURE — 93010 ELECTROCARDIOGRAM REPORT: CPT | Performed by: INTERNAL MEDICINE

## 2024-02-12 PROCEDURE — 83735 ASSAY OF MAGNESIUM: CPT

## 2024-02-12 PROCEDURE — 3700000001 HC ADD 15 MINUTES (ANESTHESIA): Performed by: SURGERY

## 2024-02-12 PROCEDURE — 7100000001 HC PACU RECOVERY - ADDTL 15 MIN: Performed by: SURGERY

## 2024-02-12 PROCEDURE — 3600000012 HC SURGERY LEVEL 2 ADDTL 15MIN: Performed by: SURGERY

## 2024-02-12 PROCEDURE — 6360000002 HC RX W HCPCS: Performed by: INTERNAL MEDICINE

## 2024-02-12 PROCEDURE — 6360000002 HC RX W HCPCS

## 2024-02-12 PROCEDURE — 80053 COMPREHEN METABOLIC PANEL: CPT

## 2024-02-12 PROCEDURE — 87205 SMEAR GRAM STAIN: CPT

## 2024-02-12 PROCEDURE — 1200000000 HC SEMI PRIVATE

## 2024-02-12 PROCEDURE — 6370000000 HC RX 637 (ALT 250 FOR IP): Performed by: INTERNAL MEDICINE

## 2024-02-12 PROCEDURE — 97606 NEG PRS WND THER DME>50 SQCM: CPT | Performed by: SURGERY

## 2024-02-12 PROCEDURE — 2709999900 HC NON-CHARGEABLE SUPPLY: Performed by: SURGERY

## 2024-02-12 PROCEDURE — 93005 ELECTROCARDIOGRAM TRACING: CPT

## 2024-02-12 PROCEDURE — 97116 GAIT TRAINING THERAPY: CPT

## 2024-02-12 PROCEDURE — 3700000000 HC ANESTHESIA ATTENDED CARE: Performed by: SURGERY

## 2024-02-12 PROCEDURE — 87186 SC STD MICRODIL/AGAR DIL: CPT

## 2024-02-12 PROCEDURE — 2580000003 HC RX 258: Performed by: INTERNAL MEDICINE

## 2024-02-12 PROCEDURE — 87070 CULTURE OTHR SPECIMN AEROBIC: CPT

## 2024-02-12 PROCEDURE — 97165 OT EVAL LOW COMPLEX 30 MIN: CPT

## 2024-02-12 PROCEDURE — 85610 PROTHROMBIN TIME: CPT

## 2024-02-12 PROCEDURE — 97162 PT EVAL MOD COMPLEX 30 MIN: CPT

## 2024-02-12 PROCEDURE — 6370000000 HC RX 637 (ALT 250 FOR IP)

## 2024-02-12 PROCEDURE — 3600000002 HC SURGERY LEVEL 2 BASE: Performed by: SURGERY

## 2024-02-12 PROCEDURE — 84100 ASSAY OF PHOSPHORUS: CPT

## 2024-02-12 PROCEDURE — 87077 CULTURE AEROBIC IDENTIFY: CPT

## 2024-02-12 PROCEDURE — 7100000000 HC PACU RECOVERY - FIRST 15 MIN: Performed by: SURGERY

## 2024-02-12 PROCEDURE — 97530 THERAPEUTIC ACTIVITIES: CPT

## 2024-02-12 PROCEDURE — 85027 COMPLETE CBC AUTOMATED: CPT

## 2024-02-12 PROCEDURE — 11043 DBRDMT MUSC&/FSCA 1ST 20/<: CPT | Performed by: SURGERY

## 2024-02-12 PROCEDURE — 84703 CHORIONIC GONADOTROPIN ASSAY: CPT

## 2024-02-12 PROCEDURE — 6360000002 HC RX W HCPCS: Performed by: SURGERY

## 2024-02-12 PROCEDURE — 11046 DBRDMT MUSC&/FSCA EA ADDL: CPT | Performed by: SURGERY

## 2024-02-12 PROCEDURE — 36415 COLL VENOUS BLD VENIPUNCTURE: CPT

## 2024-02-12 RX ORDER — ONDANSETRON 2 MG/ML
INJECTION INTRAMUSCULAR; INTRAVENOUS PRN
Status: DISCONTINUED | OUTPATIENT
Start: 2024-02-12 | End: 2024-02-12 | Stop reason: SDUPTHER

## 2024-02-12 RX ORDER — GLYCOPYRROLATE 0.2 MG/ML
INJECTION INTRAMUSCULAR; INTRAVENOUS PRN
Status: DISCONTINUED | OUTPATIENT
Start: 2024-02-12 | End: 2024-02-12 | Stop reason: SDUPTHER

## 2024-02-12 RX ORDER — FENTANYL CITRATE 50 UG/ML
INJECTION, SOLUTION INTRAMUSCULAR; INTRAVENOUS PRN
Status: DISCONTINUED | OUTPATIENT
Start: 2024-02-12 | End: 2024-02-12 | Stop reason: SDUPTHER

## 2024-02-12 RX ORDER — CEFAZOLIN SODIUM 1 G/3ML
INJECTION, POWDER, FOR SOLUTION INTRAMUSCULAR; INTRAVENOUS PRN
Status: DISCONTINUED | OUTPATIENT
Start: 2024-02-12 | End: 2024-02-12 | Stop reason: SDUPTHER

## 2024-02-12 RX ORDER — BUPIVACAINE HYDROCHLORIDE 5 MG/ML
INJECTION, SOLUTION EPIDURAL; INTRACAUDAL PRN
Status: DISCONTINUED | OUTPATIENT
Start: 2024-02-12 | End: 2024-02-12 | Stop reason: ALTCHOICE

## 2024-02-12 RX ORDER — OXYCODONE HYDROCHLORIDE 5 MG/1
5 TABLET ORAL EVERY 4 HOURS PRN
Status: DISCONTINUED | OUTPATIENT
Start: 2024-02-12 | End: 2024-02-16 | Stop reason: HOSPADM

## 2024-02-12 RX ORDER — MIDAZOLAM HYDROCHLORIDE 1 MG/ML
INJECTION INTRAMUSCULAR; INTRAVENOUS PRN
Status: DISCONTINUED | OUTPATIENT
Start: 2024-02-12 | End: 2024-02-12 | Stop reason: SDUPTHER

## 2024-02-12 RX ORDER — OXYCODONE HYDROCHLORIDE 5 MG/1
10 TABLET ORAL EVERY 4 HOURS PRN
Status: DISCONTINUED | OUTPATIENT
Start: 2024-02-12 | End: 2024-02-16 | Stop reason: HOSPADM

## 2024-02-12 RX ORDER — PROPOFOL 10 MG/ML
INJECTION, EMULSION INTRAVENOUS PRN
Status: DISCONTINUED | OUTPATIENT
Start: 2024-02-12 | End: 2024-02-12 | Stop reason: SDUPTHER

## 2024-02-12 RX ORDER — HYDROMORPHONE HYDROCHLORIDE 1 MG/ML
0.25 INJECTION, SOLUTION INTRAMUSCULAR; INTRAVENOUS; SUBCUTANEOUS EVERY 4 HOURS PRN
Status: DISCONTINUED | OUTPATIENT
Start: 2024-02-12 | End: 2024-02-16 | Stop reason: HOSPADM

## 2024-02-12 RX ORDER — LIDOCAINE HYDROCHLORIDE 20 MG/ML
INJECTION, SOLUTION INTRAVENOUS PRN
Status: DISCONTINUED | OUTPATIENT
Start: 2024-02-12 | End: 2024-02-12 | Stop reason: SDUPTHER

## 2024-02-12 RX ADMIN — PROPOFOL 50 MG: 10 INJECTION, EMULSION INTRAVENOUS at 10:03

## 2024-02-12 RX ADMIN — FENTANYL CITRATE 25 MCG: 50 INJECTION, SOLUTION INTRAMUSCULAR; INTRAVENOUS at 10:22

## 2024-02-12 RX ADMIN — SODIUM CHLORIDE, POTASSIUM CHLORIDE, SODIUM LACTATE AND CALCIUM CHLORIDE: 600; 310; 30; 20 INJECTION, SOLUTION INTRAVENOUS at 08:45

## 2024-02-12 RX ADMIN — SODIUM CHLORIDE, POTASSIUM CHLORIDE, SODIUM LACTATE AND CALCIUM CHLORIDE: 600; 310; 30; 20 INJECTION, SOLUTION INTRAVENOUS at 00:03

## 2024-02-12 RX ADMIN — SODIUM CHLORIDE, POTASSIUM CHLORIDE, SODIUM LACTATE AND CALCIUM CHLORIDE: 600; 310; 30; 20 INJECTION, SOLUTION INTRAVENOUS at 10:43

## 2024-02-12 RX ADMIN — AMPICILLIN SODIUM AND SULBACTAM SODIUM 3000 MG: 2; 1 INJECTION, POWDER, FOR SOLUTION INTRAMUSCULAR; INTRAVENOUS at 00:00

## 2024-02-12 RX ADMIN — PROPOFOL 150 MCG/KG/MIN: 10 INJECTION, EMULSION INTRAVENOUS at 10:04

## 2024-02-12 RX ADMIN — ONDANSETRON 4 MG: 2 INJECTION INTRAMUSCULAR; INTRAVENOUS at 17:57

## 2024-02-12 RX ADMIN — PHENYLEPHRINE HYDROCHLORIDE 50 MCG: 10 INJECTION, SOLUTION INTRAMUSCULAR; INTRAVENOUS; SUBCUTANEOUS at 10:43

## 2024-02-12 RX ADMIN — FERROUS SULFATE TAB 325 MG (65 MG ELEMENTAL FE) 325 MG: 325 (65 FE) TAB at 17:36

## 2024-02-12 RX ADMIN — FENTANYL CITRATE 25 MCG: 50 INJECTION, SOLUTION INTRAMUSCULAR; INTRAVENOUS at 10:39

## 2024-02-12 RX ADMIN — LIDOCAINE HYDROCHLORIDE 50 MG: 20 INJECTION, SOLUTION INTRAVENOUS at 10:03

## 2024-02-12 RX ADMIN — OXYCODONE 5 MG: 5 TABLET ORAL at 08:46

## 2024-02-12 RX ADMIN — MIDAZOLAM HYDROCHLORIDE 2 MG: 2 INJECTION, SOLUTION INTRAMUSCULAR; INTRAVENOUS at 09:56

## 2024-02-12 RX ADMIN — FENTANYL CITRATE 25 MCG: 50 INJECTION, SOLUTION INTRAMUSCULAR; INTRAVENOUS at 10:45

## 2024-02-12 RX ADMIN — GLYCOPYRROLATE 0.1 MG: 0.2 INJECTION INTRAMUSCULAR; INTRAVENOUS at 10:23

## 2024-02-12 RX ADMIN — OXYCODONE 10 MG: 5 TABLET ORAL at 12:46

## 2024-02-12 RX ADMIN — OXYCODONE 10 MG: 5 TABLET ORAL at 22:43

## 2024-02-12 RX ADMIN — ONDANSETRON 4 MG: 2 INJECTION INTRAMUSCULAR; INTRAVENOUS at 08:49

## 2024-02-12 RX ADMIN — AMPICILLIN SODIUM AND SULBACTAM SODIUM 3000 MG: 2; 1 INJECTION, POWDER, FOR SOLUTION INTRAMUSCULAR; INTRAVENOUS at 17:39

## 2024-02-12 RX ADMIN — OXYCODONE 10 MG: 5 TABLET ORAL at 17:36

## 2024-02-12 RX ADMIN — GLYCOPYRROLATE 0.1 MG: 0.2 INJECTION INTRAMUSCULAR; INTRAVENOUS at 10:09

## 2024-02-12 RX ADMIN — ONDANSETRON 4 MG: 2 INJECTION INTRAMUSCULAR; INTRAVENOUS at 10:09

## 2024-02-12 RX ADMIN — AMPICILLIN SODIUM AND SULBACTAM SODIUM 3000 MG: 2; 1 INJECTION, POWDER, FOR SOLUTION INTRAMUSCULAR; INTRAVENOUS at 05:56

## 2024-02-12 RX ADMIN — PHENYLEPHRINE HYDROCHLORIDE 50 MCG: 10 INJECTION, SOLUTION INTRAMUSCULAR; INTRAVENOUS; SUBCUTANEOUS at 10:38

## 2024-02-12 RX ADMIN — FENTANYL CITRATE 25 MCG: 50 INJECTION, SOLUTION INTRAMUSCULAR; INTRAVENOUS at 10:04

## 2024-02-12 RX ADMIN — PANTOPRAZOLE SODIUM 40 MG: 40 TABLET, DELAYED RELEASE ORAL at 05:57

## 2024-02-12 RX ADMIN — FERROUS SULFATE TAB 325 MG (65 MG ELEMENTAL FE) 325 MG: 325 (65 FE) TAB at 08:46

## 2024-02-12 RX ADMIN — CEFAZOLIN 2 G: 1 INJECTION, POWDER, FOR SOLUTION INTRAMUSCULAR; INTRAVENOUS at 10:19

## 2024-02-12 RX ADMIN — AMPICILLIN SODIUM AND SULBACTAM SODIUM 3000 MG: 2; 1 INJECTION, POWDER, FOR SOLUTION INTRAMUSCULAR; INTRAVENOUS at 12:12

## 2024-02-12 ASSESSMENT — PAIN - FUNCTIONAL ASSESSMENT
PAIN_FUNCTIONAL_ASSESSMENT: PREVENTS OR INTERFERES SOME ACTIVE ACTIVITIES AND ADLS

## 2024-02-12 ASSESSMENT — PAIN SCALES - GENERAL
PAINLEVEL_OUTOF10: 10
PAINLEVEL_OUTOF10: 8
PAINLEVEL_OUTOF10: 0
PAINLEVEL_OUTOF10: 9
PAINLEVEL_OUTOF10: 7
PAINLEVEL_OUTOF10: 0
PAINLEVEL_OUTOF10: 0

## 2024-02-12 ASSESSMENT — PAIN DESCRIPTION - ORIENTATION
ORIENTATION: RIGHT

## 2024-02-12 ASSESSMENT — PAIN DESCRIPTION - LOCATION
LOCATION: LEG

## 2024-02-12 ASSESSMENT — PAIN DESCRIPTION - PAIN TYPE
TYPE: ACUTE PAIN
TYPE: CHRONIC PAIN
TYPE: ACUTE PAIN

## 2024-02-12 ASSESSMENT — PAIN DESCRIPTION - ONSET
ONSET: ON-GOING

## 2024-02-12 ASSESSMENT — PAIN DESCRIPTION - DESCRIPTORS
DESCRIPTORS: ACHING;DISCOMFORT
DESCRIPTORS: THROBBING;BURNING
DESCRIPTORS: BURNING;SQUEEZING;STABBING
DESCRIPTORS: BURNING;STABBING

## 2024-02-12 ASSESSMENT — PAIN DESCRIPTION - FREQUENCY
FREQUENCY: CONTINUOUS

## 2024-02-12 NOTE — PROGRESS NOTES
Brought to PACU from OR. Awake, denies pain. No issues reported in OR. Placed on monitors. RLE wound vac in place.

## 2024-02-12 NOTE — PROGRESS NOTES
Consulted for existing colostomy. Pt currently off the unit in the OR. Will attempt to evaluate the pt this afternoon if time allows. If not Wound Care to see pt tomorrow.

## 2024-02-12 NOTE — PROGRESS NOTES
Independent  Homemaking Assistance: Independent  Ambulation Assistance: Independent  Transfer Assistance: Independent  Active : No  Leisure & Hobbies: work out in yard, spending time with animals  Additional Comments: Daughter not working - able to assist if needed       Objective              Safety Devices  Type of Devices: Call light within reach;Chair alarm in place;Nurse notified;Left in chair  Bed Mobility Training  Bed Mobility Training: Yes  Supine to Sit: Modified independent  Balance  Sitting: Intact  Standing: Intact (spvn)  Transfer Training  Transfer Training: Yes  Sit to Stand: Supervision  Stand to Sit: Supervision  Bed to Chair: Supervision  Gait  Gait Training: Yes  Overall Level of Assistance: Supervision        ADL  Feeding: Independent  Grooming: Independent  Grooming Skilled Clinical Factors: standing at sink with spvn  LE Dressing: Supervision  LE Dressing Skilled Clinical Factors: to adjust socks - may need assist with managing clothing around wound vac, though pt likely would be independent with education  Functional Mobility: Supervision  Functional Mobility Skilled Clinical Factors: in room, bathroom, vanegas without AD with limp due to pain and stiffness              Vision  Vision: Impaired  Vision Exceptions: Wears glasses for reading  Hearing  Hearing: Within functional limits  Cognition  Overall Cognitive Status: WFL  Orientation  Overall Orientation Status: Within Functional Limits                  Education Given To: Patient  Education Provided: Role of Therapy;Plan of Care;Precautions;Energy Conservation;Fall Prevention Strategies  Education Method: Verbal  Barriers to Learning: None  Education Outcome: Verbalized understanding                          AM-PAC - ADL  AM-PAC Daily Activity - Inpatient   How much help is needed for putting on and taking off regular lower body clothing?: A Little  How much help is needed for bathing (which includes washing, rinsing, drying)?: A  Little  How much help is needed for toileting (which includes using toilet, bedpan, or urinal)?: None  How much help is needed for putting on and taking off regular upper body clothing?: None  How much help is needed for taking care of personal grooming?: None  How much help for eating meals?: None  AM-[facility] Inpatient Daily Activity Raw Score: 22  AM-PAC Inpatient ADL T-Scale Score : 47.1  ADL Inpatient CMS 0-100% Score: 25.8  ADL Inpatient CMS G-Code Modifier : CJ      Therapy Time   Individual Concurrent Group Co-treatment   Time In 1409         Time Out 1435         Minutes 26          Timed Code Tx Min: 11  Total Tx time: 26       Mariela Hartley, OT

## 2024-02-12 NOTE — BH NOTE
Psychiatry consult placed for capacity evaluation but chart review indicates patient was assessed by surgical team, who noted her to be perfectly oriented when I speak with her and asked appropriate questions and agrees to proceed with surgery. Documentation indicates she was taken to OR at 0904 and current location in [facility] is identified as OR.    Judie Helms, TONY - CNP  02/12/24

## 2024-02-12 NOTE — PROGRESS NOTES
PACU Transfer Note    Vitals:    02/12/24 1130   BP: 134/85   Pulse: 73   Resp: 10   Temp: 98.5f   SpO2: 100%       In: 1100 [I.V.:1100]  Out: - 0    Pain assessment:  none  Pain Level: 0    Report given to Receiving unit RN via phone. Pt denies pain. Wound vac to RLE. No drainage. Taken back to her room per Becky pacu transporter.    2/12/2024 11:42 AM

## 2024-02-12 NOTE — PROGRESS NOTES
Physical Therapy  Facility/Department: [address]  Physical Therapy Initial Assessment and Treatment/Discharge    Name: Guero Beckwith  : 1975  MRN: [id number]  Date of Service: 2024    Discharge Recommendations:  Home with assist PRN   PT Equipment Recommendations  Equipment Needed: No      Patient Diagnosis(es): The encounter diagnosis was Wound of right lower extremity, subsequent encounter.  Past Medical History:  has a past medical history of Colostomy in place (HCC), Essential hypertension, History of blood transfusion, and Psychiatric problem.  Past Surgical History:  has a past surgical history that includes Abdomen surgery; Appendectomy; Colonoscopy; Dilatation, esophagus; Tonsillectomy; and Leg Surgery (Right, 2024).    Assessment   Assessment: Pt presented to hospital for RLE wound.  Pt s/pvRIGHT LEG WOUND DEBRIDEMENT and Wound vac Application on .  Pt limited by pain/discomfort RLE however able to ambulate with supervision.  Pt plans to return home with daughter.  Has no concerns about managing at d/c.  No further PT needs identified  Decision Making: Medium Complexity  Requires PT Follow-Up: No     Plan   Physical Therapy Plan  General Plan: Discharge with evaluation only  Safety Devices  Type of Devices: Call light within reach, Chair alarm in place, Nurse notified, Left in chair     Restrictions  Position Activity Restriction  Other position/activity restrictions: up as tolerated     Subjective   General  Additional Pertinent Hx: Pt is a 48 y.o. female adm 2/10 with severe anemia.  Pt presented to ED with worsening leg wound   CT R tib/fib:Medial leg soft tissue ulceration as measured surrounding soft tissue thickening/induration and associated soft tissue edema. No fluid collection.  CXR: neg. Pt s/p Excisional Debridement of Right leg wound on 2/10  Pt s/pRIGHT LEG WOUND DEBRIDEMENT  Wound vac Application on .      PMH:colostomy for rectal incontinence,

## 2024-02-12 NOTE — PROGRESS NOTES
Occupational/Physical Therapy    Pt is off the floor for a procedure. Will f/u later today vs. 2/13 as pt and therapy schedules allow.     Mariela Hartley, OTR/L, [id number]  Thi Jimenez, PT [id number]

## 2024-02-12 NOTE — ANESTHESIA POSTPROCEDURE EVALUATION
Department of Anesthesiology  Postprocedure Note    Patient: Guero Beckwith  MRN: [id number]  YOB: 1975  Date of evaluation: 2/12/2024    Procedure Summary       Date: 02/12/24 Room / Location: [place] [address] / [facility]    Anesthesia Start: 0956 Anesthesia Stop: 1110    Procedure: RIGHT LEG WOUND DEBRIDEMENT (Right: Leg Lower) Diagnosis:       Wound of right lower extremity, subsequent encounter      (Wound of right lower extremity, subsequent encounter [S81.801D])    Surgeons: Jt Ma MD Responsible Provider: Heidi Villanueva MD    Anesthesia Type: MAC ASA Status: 3 - Emergent            Anesthesia Type: MAC    Mikki Phase I: Mikki Score: 10    Mikki Phase II:      Anesthesia Post Evaluation    Patient location during evaluation: PACU  Patient participation: complete - patient participated  Level of consciousness: awake  Pain score: 2  Airway patency: patent  Nausea & Vomiting: no nausea and no vomiting  Cardiovascular status: hemodynamically stable  Respiratory status: acceptable  Hydration status: euvolemic  Pain management: adequate    No notable events documented.

## 2024-02-12 NOTE — PERIOP NOTE
Have attempted multiple times to contact patient's family, both her daughter Wendy and her mother Loree.Messages left for daughter twice, Unable to leave message with mother.  Dr Ma to declare an emergency.

## 2024-02-12 NOTE — PROGRESS NOTES
Patient keep NPO from 12 midnight and IV fluid started as ordered, for wound debridement today. Still to collect urine for pregnancy test.

## 2024-02-12 NOTE — CONSULTS
findings noted above. I edited the note where appropriate in italics, strikethrough font, or underline.    Patient with LE wound in the setting of bipolar disorder with picking.  Will continue with instillation and antibiotics and plan for OR for STSG on Thursday.    Lowell Whatley MD  [facility]astic [facility] Re[facility] Surgery  [phone]  02/13/24

## 2024-02-12 NOTE — BRIEF OP NOTE
Brief Postoperative Note      Patient: Guero Beckwith  YOB: 1975  MRN: [id number]    Date of Procedure: 2/12/2024    Pre-Op Diagnosis Codes:     * Wound of right lower extremity, subsequent encounter [S81.801D]    Post-Op Diagnosis: Same       Procedure(s):  RIGHT LEG WOUND DEBRIDEMENT  Wound vac Application    Surgeon(s):  Jt Ma MD    Assistant:  Resident: Victorino Rodriguez DO    Anesthesia: Monitor Anesthesia Care    Estimated Blood Loss (mL): less than 50     Complications: None    Specimens:   ID Type Source Tests Collected by Time Destination   1 : RIGHT LEG WOUNDSWAB- AEROBIC, ANAEROBIC, FUNGUS, GRAM STAIN Specimen Leg CULTURE, WOUND Jt Ma MD 2/12/2024 1045    2 : RIGHT LEG WOUND TISSUE-AEROBIC, ANAEROBIC, GRAM STAIN, AFB Tissue Tissue CULTURE, TISSUE Jt Ma MD 2/12/2024 1047    A : RIGHT LEG WOUND TISSUE Tissue Tissue SURGICAL PATHOLOGY Jt Ma MD 2/12/2024 1049        Implants:  * No implants in log *      Drains:   Colostomy RLQ (Active)   Stomal Appliance 2 piece;Changed 02/12/24 0852   Stoma  Assessment Pink;Protrudes 02/12/24 0852   Peristomal Assessment Clean, dry & intact 02/12/24 0852   Treatment Pouch change;Site care;Tape changed;Stoma paste 02/11/24 0845   Stool Appearance Soft 02/12/24 0852   Stool Color Brown 02/12/24 0852   Stool Amount Medium 02/12/24 0852       Findings: RLE wound debridement. New wound measuring 9cm by 9.5cm. Exposed musculature at base. Wound irrigated copiously. Hemostasis achieved. Adaptic applied to surface of wound with Irrigating, black foam wound vac applied to wound. Good seal obtained. Tissue sent for culture and pathology. Wound culture sent.       Electronically signed by Victorino Rodriguez DO on 2/12/2024 at 11:12 AM

## 2024-02-12 NOTE — PROGRESS NOTES
V2.0    [facility] [place]      Name:  Guero Parker /Age/Sex: 1975  (48 y.o. female)   MRN & CSN:  [id number] & [id number] Encounter Date/Time: 2024 9:29 AM EST   Location:  [facility]- PCP: Lydia Grider PA-C     Attending:Sunita Renee MD       Hospital Day: 3    Assessment and Recommendations   Guero Parker is a 48 y.o. female with pmh of multiple surgeries for hernia, bipolar disorder, GERD who was transferred from outside hospital for anemia and leg wound.  Patient's workup revealed iron deficiency anemia.  Surgery was consulted for the right lower leg wound underwent bedside debridement on 2/10/2024 went to the OR for debridement on 2020      Plan:     Wound on the right lower leg- For OR today   --S/p debridement on 2/10/2024  -Surgery consulted appreciated  -Unasyn day 2  -CT tibia shows ulcer with soft tissue swelling no involvement of bone  -wound c/s and biopsy sent from the OR    Anemia-iron deficient  -Per transfer center patient's hemoglobin was 6.6 on admission.  Repeat hemoglobin 8.6  -Start iron supplement       Ileostomy  -Unclear why patient had new extremity.  Patient does not know she states that because of her hernia surgery repair  -Ileostomy care.  Patient does not have supplies and has been using duct tape to tape the back to     Past history of bipolar disorder  -At present not on medication     GERD  -On PPI     Tobacco abuse  -Patient states she smokes off-and-on, declines nicotine patch    Polysubstance abuse  - tox screen + for amphetamine    Diet Diet NPO   DVT Prophylaxis [x] Lovenox, []  Heparin, [] SCDs, [] Ambulation,  [] Eliquis, [] Xarelto  [] Coumadin   Code Status Full Code   Disposition From:   Expected Disposition: home  Estimated Date of Discharge: 2-3 days  Patient requires continued admission due to infected wound   Surrogate Decision Maker/ POA       Personally reviewed Lab Studies and Imaging     Discussed management of the case with

## 2024-02-12 NOTE — PLAN OF CARE
Problem: Pain  Goal: Verbalizes/displays adequate comfort level or baseline comfort level  Outcome: Progressing  Patient's pain being managed with Oxycodone.  Patient did experience nausea today and was given 2 doses of Zofran shortly after the medication.  Patient thought the second episode of nausea was from her dinner.    Problem: Skin/Tissue Integrity  Goal: Absence of new skin breakdown  Description: 1.  Monitor for areas of redness and/or skin breakdown    Outcome: Progressing   Wound VAC applied in the OR today .

## 2024-02-12 NOTE — PROGRESS NOTES
Surgical case is deemed emergency given the need for debridement today to prevent worsening of her leg wound.  Patient seems to be perfectly oriented when I speak with her and asked appropriate questions and agrees to proceed with surgery.

## 2024-02-12 NOTE — ANESTHESIA PRE PROCEDURE
Department of Anesthesiology  Preprocedure Note       Name:  Guero Parker   Age:  48 y.o.  :  1975                                          MRN:  [id number]         Date:  2024      Surgeon: Surgeon(s):  Jt Ma MD    Procedure: Procedure(s):  RIGHT LEG WOUND DEBRIDEMENT    Medications prior to admission:   Prior to Admission medications    Medication Sig Start Date End Date Taking? Authorizing Provider   pantoprazole (PROTONIX) 20 MG tablet Take 2 tablets by mouth daily    Provider, MD Leonor       Current medications:    Current Facility-Administered Medications   Medication Dose Route Frequency Provider Last Rate Last Admin   • ferrous sulfate (IRON 325) tablet 325 mg  325 mg Oral BID WC Sunita Renee MD   325 mg at 24 0846   • ampicillin-sulbactam (UNASYN) 3,000 mg in sodium chloride 0.9 % 100 mL IVPB (mini-bag)  3,000 mg IntraVENous Q6H Sunita Renee MD   Stopped at 24 0640   • lactated ringers IV soln infusion   IntraVENous Continuous Will Rivera  mL/hr at 24 0845 New Bag at 24 0845   • oxyCODONE (ROXICODONE) immediate release tablet 2.5 mg  2.5 mg Oral Q4H PRN Will Rivera DO        Or   • oxyCODONE (ROXICODONE) immediate release tablet 5 mg  5 mg Oral Q4H PRN Will Rivera DO   5 mg at 24 0846   • sodium chloride flush 0.9 % injection 5-40 mL  5-40 mL IntraVENous 2 times per day Sunita Renee MD   10 mL at 24 2019   • sodium chloride flush 0.9 % injection 5-40 mL  5-40 mL IntraVENous PRN Sunita Renee MD       • 0.9 % sodium chloride infusion   IntraVENous PRN Sunita Renee MD 5 mL/hr at 24 2359 New Bag at 24   • potassium chloride (KLOR-CON M) extended release tablet 40 mEq  40 mEq Oral PRN Sunita Renee MD        Or   • potassium bicarb-citric acid (EFFER-K) effervescent tablet 40 mEq  40 mEq Oral PRN Sunita Renee MD        Or   • potassium chloride 10 mEq/100 mL IVPB

## 2024-02-12 NOTE — CARE COORDINATION
Case Management Assessment  Initial Evaluation    Date/Time of Evaluation: 2/12/2024 3:48 PM  Assessment Completed by: Susan Copeland RN    If patient is discharged prior to next notation, then this note serves as note for discharge by case management.    Patient Name: Guero Beckwith                   YOB: 1975  Diagnosis: Severe anemia [D64.9]  Anemia [D64.9]                   Date / Time: 2/10/2024 11:13 AM    Patient Admission Status: Inpatient   Readmission Risk (Low < 19, Mod (19-27), High > 27): Readmission Risk Score: 10.8    Current PCP: Lydia Grider PA-C  PCP verified by CM? Yes    Chart Reviewed: Yes      History Provided by: Patient  Patient Orientation: Alert and Oriented    Patient Cognition: Alert    Hospitalization in the last 30 days (Readmission):  No    If yes, Readmission Assessment in CM Navigator will be completed.    Advance Directives:      Code Status: Full Code   Patient's Primary Decision Maker is: Legal Next of Kin      Discharge Planning:    Patient lives with: Children, Family Members Type of Home: House  Primary Care Giver: Self  Patient Support Systems include: Children, Family Members   Current Financial resources: Medicaid  Current community resources: ECF/Home Care  Current services prior to admission: None            Current DME:              Type of Home Care services:  None    ADLS  Prior functional level: Independent in ADLs/IADLs  Current functional level: Independent in ADLs/IADLs    PT AM-PAC:   /24  OT AM-PAC: 22 /24    Family can provide assistance at DC: Yes  Would you like Case Management to discuss the discharge plan with any other family members/significant others, and if so, who? No  Plans to Return to Present Housing: Yes  Other Identified Issues/Barriers to RETURNING to current housing: safety  Potential Assistance needed at discharge: N/A            Potential DME:    Patient expects to discharge to: House  Plan for transportation at discharge:

## 2024-02-12 NOTE — OP NOTE
Operative Note      Patient: Guero Parker  YOB: 1975  MRN: [id number]    Date of Procedure: 2/12/2024    Pre-Op Diagnosis Codes:     * Wound of right lower extremity, subsequent encounter [S81.801D]    Post-Op Diagnosis: Same       Procedure(s):  RIGHT LEG WOUND DEBRIDEMENT, including skin, subcutaneous tissue, muscle and fascia measuring 9.0 x 9.5 cm with wound vac placement    Surgeon(s):  Jt Ma MD    Assistant:   Resident: Victorino Rodriguez DO    Anesthesia: Monitor Anesthesia Care    Estimated Blood Loss (mL): less than 100     Complications: None    Specimen: Right calf debridement sent for culture and permanent examination    Findings: Chronically edematous and thickened tissue around large abscess cavity    Indications: 48-year-old female admitted to the hospital.  Noted to have large right calf wound.  Underwent bedside incision and drainage.  Further concerns for edema and cellulitis so operative intervention elected.  I had several discussions with her regarding the plan for operative excisional debridement.  She understood and wished to proceed.  We discussed specifically the risks of bleeding, infection, need for multiple operations, plastic surgery involvement, damage to nerves and blood vessels.  She agreed to proceed.      Detailed Description of Procedure:   Patient was brought to the operative theater placed supine.  Sedation was started by anesthesia.  Her right lower extremity was prepped and draped using Betadine solution.  Timeout was performed confirming the patient identity as well as the operative site.  Antibiotics were confirmed to be perfusing.  All safety points were followed.  SCDs were on and functioning on the left leg.    I began by palpating and demarcating with a marker the edge of the edematous and cellulitic appearing tissue.  I used cut current above electrocautery to incise through the skin down through the subcutaneous tissue down to the muscle and  fascia.  The inner portion of the abscess cavity actually had extended through the fascia already.  We met all the previous planes back together using cautery debriding the entirety of the inflamed and chronic wound.  This could extended through the skin, subcutaneous tissue, and through the fascia involving some of the fascia and some muscle.  The specimen was labeled as right calf debridement and sent for both permanent examination as well as culture.    The wound was then thoroughly irrigated.  There were a few small veins that required suture ligation using 3-0 Vicryl sutures.  The wound was measured and photodocumentation was obtained.  A wound VAC was applied.    Patient tolerated the procedure well and was brought to the PACU in stable condition.  All counts were correct x 2.    Plastic surgery will be involved for subsequent evaluation and likely take back to the operating room in the coming days and weeks.    Electronically signed by Jt Ma MD on 2/12/2024 at 10:38 AM

## 2024-02-12 NOTE — PROGRESS NOTES
Department of Surgery  Post Op Note    Objective: States pain is adequately controlled with current pain regime. Pt was ambulating in hallway with PT.   Anesthesia type: General      I/O    Intra op    Post op     Fluids    1100 125     EBL  minimal       Urine 0 150       Exam: VITALS:  /84   Pulse 79   Temp 97.8 °F (36.6 °C) (Oral)   Resp 16   Wt 88.5 kg (195 lb 1.7 oz)   SpO2 98%   BMI 32.47 kg/m²   Post-op vital signs:  Stable     Exam:General appearance: alert, appears stated age, and cooperative  Lungs: symmetrical chest rise on room air  Heart:  perfusing well  Abdomen: soft non tender       Assessment and Plan  Pt is a 48 year old female s/p RIGHT LEG WOUND DEBRIDEMENT  POD #0    Pain management- tylenol, dilaudid, oxycodone  Diet - Regular , Fluids  ml/hour   :  Urine output is adequate, has voided independently   Ambulation: OOB to chair, has been ambulating in hallway with PT  Respiratory:  IS at bedside, encourage hourly IS and deep breathing  Prophylaxis: AC boots  Irrigating Wound vac to JATINDER Aldrich, CNP  2/12/24  [phone]

## 2024-02-12 NOTE — PLAN OF CARE
Problem: Pain  Goal: Verbalizes/displays adequate comfort level or baseline comfort level  Outcome: Progressing     Problem: Skin/Tissue Integrity  Goal: Absence of new skin breakdown  Description: 1.  Monitor for areas of redness and/or skin breakdown  2.  Assess vascular access sites hourly  3.  Every 4-6 hours minimum:  Change oxygen saturation probe site  4.  Every 4-6 hours:  If on nasal continuous positive airway pressure, respiratory therapy assess nares and determine need for appliance change or resting period.  Outcome: Progressing     Problem: ABCDS Injury Assessment  Goal: Absence of physical injury  Outcome: Progressing     Problem: Discharge Planning  Goal: Discharge to home or other facility with appropriate resources  Outcome: Progressing

## 2024-02-13 PROBLEM — F31.32 BIPOLAR AFFECTIVE DISORDER, CURRENTLY DEPRESSED, MODERATE (HCC): Status: ACTIVE | Noted: 2024-02-13

## 2024-02-13 PROBLEM — S81.801A WOUND OF RIGHT LEG: Status: ACTIVE | Noted: 2024-02-13

## 2024-02-13 LAB
ALBUMIN SERPL-MCNC: 3 G/DL (ref 3.4–5)
ALBUMIN SERPL-MCNC: 3 G/DL (ref 3.4–5)
ANION GAP SERPL CALCULATED.3IONS-SCNC: 9 MMOL/L (ref 3–16)
BASOPHILS # BLD: 0.1 K/UL (ref 0–0.2)
BASOPHILS NFR BLD: 0.8 %
BUN SERPL-MCNC: 6 MG/DL (ref 7–20)
CALCIUM SERPL-MCNC: 8.2 MG/DL (ref 8.3–10.6)
CHLORIDE SERPL-SCNC: 105 MMOL/L (ref 99–110)
CO2 SERPL-SCNC: 23 MMOL/L (ref 21–32)
CREAT SERPL-MCNC: 0.9 MG/DL (ref 0.6–1.1)
DEPRECATED RDW RBC AUTO: 18.5 % (ref 12.4–15.4)
EOSINOPHIL # BLD: 1 K/UL (ref 0–0.6)
EOSINOPHIL NFR BLD: 15.9 %
GFR SERPLBLD CREATININE-BSD FMLA CKD-EPI: >60 ML/MIN/{1.73_M2}
GLUCOSE SERPL-MCNC: 99 MG/DL (ref 70–99)
HCT VFR BLD AUTO: 27.3 % (ref 36–48)
HGB BLD-MCNC: 8.6 G/DL (ref 12–16)
LYMPHOCYTES # BLD: 2.1 K/UL (ref 1–5.1)
LYMPHOCYTES NFR BLD: 32.8 %
MAGNESIUM SERPL-MCNC: 1.7 MG/DL (ref 1.8–2.4)
MCH RBC QN AUTO: 23.6 PG (ref 26–34)
MCHC RBC AUTO-ENTMCNC: 31.7 G/DL (ref 31–36)
MCV RBC AUTO: 74.7 FL (ref 80–100)
MONOCYTES # BLD: 0.6 K/UL (ref 0–1.3)
MONOCYTES NFR BLD: 9.4 %
NEUTROPHILS # BLD: 2.7 K/UL (ref 1.7–7.7)
NEUTROPHILS NFR BLD: 41.1 %
PHOSPHATE SERPL-MCNC: 3.2 MG/DL (ref 2.5–4.9)
PLATELET # BLD AUTO: 311 K/UL (ref 135–450)
PMV BLD AUTO: 7 FL (ref 5–10.5)
POTASSIUM SERPL-SCNC: 3.1 MMOL/L (ref 3.5–5.1)
RBC # BLD AUTO: 3.66 M/UL (ref 4–5.2)
SODIUM SERPL-SCNC: 137 MMOL/L (ref 136–145)
WBC # BLD AUTO: 6.5 K/UL (ref 4–11)

## 2024-02-13 PROCEDURE — 99231 SBSQ HOSP IP/OBS SF/LOW 25: CPT | Performed by: SURGERY

## 2024-02-13 PROCEDURE — 6370000000 HC RX 637 (ALT 250 FOR IP): Performed by: NURSE PRACTITIONER

## 2024-02-13 PROCEDURE — 6370000000 HC RX 637 (ALT 250 FOR IP)

## 2024-02-13 PROCEDURE — 6360000002 HC RX W HCPCS

## 2024-02-13 PROCEDURE — 99255 IP/OBS CONSLTJ NEW/EST HI 80: CPT | Performed by: NURSE PRACTITIONER

## 2024-02-13 PROCEDURE — 80069 RENAL FUNCTION PANEL: CPT

## 2024-02-13 PROCEDURE — 36415 COLL VENOUS BLD VENIPUNCTURE: CPT

## 2024-02-13 PROCEDURE — 1200000000 HC SEMI PRIVATE

## 2024-02-13 PROCEDURE — 83036 HEMOGLOBIN GLYCOSYLATED A1C: CPT

## 2024-02-13 PROCEDURE — 2580000003 HC RX 258

## 2024-02-13 PROCEDURE — 82040 ASSAY OF SERUM ALBUMIN: CPT

## 2024-02-13 PROCEDURE — 85025 COMPLETE CBC W/AUTO DIFF WBC: CPT

## 2024-02-13 PROCEDURE — 99222 1ST HOSP IP/OBS MODERATE 55: CPT | Performed by: SURGERY

## 2024-02-13 PROCEDURE — 83735 ASSAY OF MAGNESIUM: CPT

## 2024-02-13 RX ORDER — POTASSIUM CHLORIDE 20 MEQ/1
40 TABLET, EXTENDED RELEASE ORAL ONCE
Status: COMPLETED | OUTPATIENT
Start: 2024-02-13 | End: 2024-02-13

## 2024-02-13 RX ORDER — QUETIAPINE FUMARATE 25 MG/1
50 TABLET, FILM COATED ORAL NIGHTLY
Status: DISCONTINUED | OUTPATIENT
Start: 2024-02-13 | End: 2024-02-16 | Stop reason: HOSPADM

## 2024-02-13 RX ORDER — MAGNESIUM SULFATE IN WATER 40 MG/ML
2000 INJECTION, SOLUTION INTRAVENOUS ONCE
Status: COMPLETED | OUTPATIENT
Start: 2024-02-13 | End: 2024-02-13

## 2024-02-13 RX ADMIN — ENOXAPARIN SODIUM 40 MG: 100 INJECTION SUBCUTANEOUS at 08:20

## 2024-02-13 RX ADMIN — MAGNESIUM SULFATE HEPTAHYDRATE 2000 MG: 40 INJECTION, SOLUTION INTRAVENOUS at 09:39

## 2024-02-13 RX ADMIN — FERROUS SULFATE TAB 325 MG (65 MG ELEMENTAL FE) 325 MG: 325 (65 FE) TAB at 08:20

## 2024-02-13 RX ADMIN — POTASSIUM CHLORIDE 40 MEQ: 1500 TABLET, EXTENDED RELEASE ORAL at 06:43

## 2024-02-13 RX ADMIN — SODIUM CHLORIDE, PRESERVATIVE FREE 10 ML: 5 INJECTION INTRAVENOUS at 08:20

## 2024-02-13 RX ADMIN — ONDANSETRON 4 MG: 4 TABLET, ORALLY DISINTEGRATING ORAL at 08:33

## 2024-02-13 RX ADMIN — OXYCODONE 10 MG: 5 TABLET ORAL at 03:44

## 2024-02-13 RX ADMIN — ONDANSETRON 4 MG: 4 TABLET, ORALLY DISINTEGRATING ORAL at 18:55

## 2024-02-13 RX ADMIN — OXYCODONE 10 MG: 5 TABLET ORAL at 08:19

## 2024-02-13 RX ADMIN — AMPICILLIN SODIUM AND SULBACTAM SODIUM 3000 MG: 2; 1 INJECTION, POWDER, FOR SOLUTION INTRAMUSCULAR; INTRAVENOUS at 18:22

## 2024-02-13 RX ADMIN — AMPICILLIN SODIUM AND SULBACTAM SODIUM 3000 MG: 2; 1 INJECTION, POWDER, FOR SOLUTION INTRAMUSCULAR; INTRAVENOUS at 00:16

## 2024-02-13 RX ADMIN — SODIUM CHLORIDE, PRESERVATIVE FREE 10 ML: 5 INJECTION INTRAVENOUS at 21:23

## 2024-02-13 RX ADMIN — OXYCODONE 10 MG: 5 TABLET ORAL at 12:25

## 2024-02-13 RX ADMIN — PANTOPRAZOLE SODIUM 40 MG: 40 TABLET, DELAYED RELEASE ORAL at 06:43

## 2024-02-13 RX ADMIN — AMPICILLIN SODIUM AND SULBACTAM SODIUM 3000 MG: 2; 1 INJECTION, POWDER, FOR SOLUTION INTRAMUSCULAR; INTRAVENOUS at 12:17

## 2024-02-13 RX ADMIN — AMPICILLIN SODIUM AND SULBACTAM SODIUM 3000 MG: 2; 1 INJECTION, POWDER, FOR SOLUTION INTRAMUSCULAR; INTRAVENOUS at 06:45

## 2024-02-13 RX ADMIN — FERROUS SULFATE TAB 325 MG (65 MG ELEMENTAL FE) 325 MG: 325 (65 FE) TAB at 18:22

## 2024-02-13 RX ADMIN — POTASSIUM CHLORIDE 40 MEQ: 1500 TABLET, EXTENDED RELEASE ORAL at 09:37

## 2024-02-13 RX ADMIN — QUETIAPINE FUMARATE 50 MG: 25 TABLET ORAL at 21:22

## 2024-02-13 RX ADMIN — OXYCODONE 10 MG: 5 TABLET ORAL at 21:22

## 2024-02-13 ASSESSMENT — PAIN SCALES - GENERAL
PAINLEVEL_OUTOF10: 8
PAINLEVEL_OUTOF10: 7
PAINLEVEL_OUTOF10: 2
PAINLEVEL_OUTOF10: 8
PAINLEVEL_OUTOF10: 8
PAINLEVEL_OUTOF10: 9
PAINLEVEL_OUTOF10: 0
PAINLEVEL_OUTOF10: 8

## 2024-02-13 ASSESSMENT — PAIN DESCRIPTION - PAIN TYPE
TYPE: ACUTE PAIN

## 2024-02-13 ASSESSMENT — PAIN DESCRIPTION - ORIENTATION
ORIENTATION: RIGHT

## 2024-02-13 ASSESSMENT — PAIN DESCRIPTION - ONSET
ONSET: ON-GOING

## 2024-02-13 ASSESSMENT — PAIN - FUNCTIONAL ASSESSMENT
PAIN_FUNCTIONAL_ASSESSMENT: PREVENTS OR INTERFERES SOME ACTIVE ACTIVITIES AND ADLS

## 2024-02-13 ASSESSMENT — PAIN DESCRIPTION - DESCRIPTORS
DESCRIPTORS: STABBING;BURNING
DESCRIPTORS: BURNING;THROBBING;TIGHTNESS
DESCRIPTORS: ACHING;DISCOMFORT

## 2024-02-13 ASSESSMENT — PAIN DESCRIPTION - LOCATION
LOCATION: LEG

## 2024-02-13 ASSESSMENT — PAIN DESCRIPTION - DIRECTION
RADIATING_TOWARDS: LEG
RADIATING_TOWARDS: LEG

## 2024-02-13 ASSESSMENT — PAIN DESCRIPTION - FREQUENCY
FREQUENCY: CONTINUOUS
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
## 2024-02-13 NOTE — ADT AUTH CERT
Subscriber Details  Hospital Account #[id number]  [facility] [place] Subscriber Name/Sex/Relation Subscriber  Subscriber Address/Phone Subscriber Emp/Emp Phone   1. TARIQ   [phone] GUERO BECKWITH - Female   (Self) 1975 [address] [place], [place] [id number]   [phone](H) UNEMPLOYED      Utilization Reviews       Physician advisor inpt letter by Madai Britt RN  Last Updated by Madai Britt RN on 2024 1525     Review Status Created By   In Primary Madai Britt RN       Review Type   --      Criteria Review   We recommend that the following pt's current hospitalization under Inpatient status is APPROPRIATE .       Name: Guero Beckwith   : 1975   CSN: [id number]   [employer]        Clinical summary   48 year old admitted with acute anemia  Hgb dropped to 6.6, monitor H/H  General surgery and psych consults    S/p RIGHT LEG WOUND DEBRIDEMENT   IV Unasyn  IV Dilaudid  IVF  Labs and Imaging reviewed  Status decision based on clinical judgment and Commercial Utilization criteria, e.g., [employer], [employer]   Comments due to medical necessity, this patient is appropriate for IP            by Madai Britt RN  Last Updated by Madai Britt RN on 2024 0956     Review Status Created By   In Primary Madai Britt RN       Review Type   --      Criteria Review   DATE:         Pt remains on medsurg unit        VITALS:  t: 37 p: 76 rr: 18 bp: 140/90 spo2: 100% ra        ABNL/PERTINENT LABS:  hgb: 8.9        PHYSICAL EXAM:  Extremities: No edema, no cyanosis. RLE medial calf with 4-cm diameter wound without surrounding warmth or erythema however persistent induration. no purulence expressed, no crepitus [see image]. Several excoriations along leg.          MD CONSULTS/ASSESSMENT AND PLAN:  Per General surgery PN:     ASSESSMENT & PLAN:   This is a 48 y.o. female with right leg wound s/p bedside debridement 2/10/24.     Some grayish tissue at posterior border  destruction.       PERTINENT MEDICAL HISTORY:   Colostomy in place (HCC)      Essential hypertension 9/21/2015    History of blood transfusion      Psychiatric problem       depression, anxiety, bipolar         PHYSICAL EXAM:  General: NAD  Eyes: EOMI  ENT: neck supple  Cardiovascular: Regular rate.  Respiratory: Clear to auscultation  Gastrointestinal: Soft, non tender, ostomy +  Genitourinary: no suprapubic tenderness  Musculoskeletal: No edema  Skin: 1 cm x 1 cm opening present on the lower leg with surrounding necrotic area and induration  Neuro: Alert.  Psych: Mood appropriate.      MD CONSULTS/ASSESSMENTS & PLANS:  Per IM PN:  Assessment and Plan:   Guero Parker is a 48 y.o. female with a pmh of bipolar disorder, ileostomy who presents with Severe anemia     Hospital Problems               Last Modified POA     * (Principal) Severe anemia 2/10/2024 Yes     Anemia 2/10/2024 Yes         Plan:     Anemia  -Per transfer center patient's hemoglobin was 6.6 on admission  -Will repeat hemoglobin  -Anemia workup ordered  -Patient denies any bleeding or black-colored stools.  No active bleeding     Wound on the right lower leg  -Wound appears packed necrotic  -Surgery consulted  -Hold off antibiotics  -CT ordered to evaluate depth     Ileostomy  -Unclear why patient had new extremity.  Patient does not know she states that because of her hernia surgery repair  -Ileostomy care.  Patient does not have supplies and has been using duct tape to tape the back to     MEDICATIONS:  Keflex 500mg po qid     ORDERS:  NPO  Psychiatry consult              Anemia, Iron Deficiency or Unspecified Clinical Indications for Admission to Inpatient Care by Madai Britt RN  Last Updated by Madai Britt RN on 2/12/2024 0942     Review Status Created By   Primary Completed Madai Britt RN       Review Type   Admission      Criteria Review   Anemia, Iron Deficiency or Unspecified Clinical Indications for Admission to

## 2024-02-13 NOTE — PROGRESS NOTES
per day    enoxaparin  40 mg SubCUTAneous Daily    pantoprazole  40 mg Oral QAM AC      Infusions:    lactated ringers IV soln 50 mL/hr at 02/12/24 1737    sodium chloride 5 mL/hr at 02/11/24 2359     PRN Meds: oxyCODONE, 5 mg, Q4H PRN   Or  oxyCODONE, 10 mg, Q4H PRN  HYDROmorphone, 0.25 mg, Q4H PRN  sodium chloride flush, 5-40 mL, PRN  sodium chloride, , PRN  potassium chloride, 40 mEq, PRN   Or  potassium alternative oral replacement, 40 mEq, PRN   Or  potassium chloride, 10 mEq, PRN  magnesium sulfate, 2,000 mg, PRN  ondansetron, 4 mg, Q8H PRN   Or  ondansetron, 4 mg, Q6H PRN  polyethylene glycol, 17 g, Daily PRN  acetaminophen, 650 mg, Q6H PRN   Or  acetaminophen, 650 mg, Q6H PRN        Labs and Imaging   CT TIBIA FIBULA RIGHT WO CONTRAST    Result Date: 2/10/2024  CT TIBIA FIBULA RIGHT WO CONTRAST   INDICATION: 48 years Female; \"ulcer possibly involving the bone\" TECHNIQUE: CT of the right tibia/fibula without contrast was performed according to standard protocol. Up-to-date CT equipment and radiation dose reduction techniques were employed. COMPARISON: None FINDINGS: Focal soft tissue ulceration of the medial leg measuring 19 x 16 mm with 13 mm depth. Punctate hyperdensities within the ulceration may reflect foreign bodies. Surrounding soft tissue thickening/induration with associated soft tissue edema. No organized fluid collection. Visualized osseous structures are intact. No acute fracture or dislocation. No osseous erosion.     1.  Medial leg soft tissue ulceration as measured surrounding soft tissue thickening/induration and associated soft tissue edema. No fluid collection. 2.  Punctate hyperdensities within the ulceration may reflect foreign bodies. 3.  No osseous erosion or destruction. Electronically signed by Rigo Varela MD      CBC:   Recent Labs     02/10/24  1247 02/11/24  0812 02/12/24  0811   WBC 6.0 6.3 5.9   HGB 8.7* 8.9* 9.0*    328 316     BMP:    Recent Labs     02/11/24  0812  02/12/24  0811 02/13/24  0526    140 137   K 3.6 3.5 3.1*    108 105   CO2 23 24 23   BUN 8 7 6*   CREATININE 0.8 0.8 0.9   GLUCOSE 85 84 99     Hepatic:   Recent Labs     02/10/24  1247 02/11/24  0812 02/12/24  0811   AST 20 16 12*   ALT 13 11 9*   BILITOT 0.3 0.3 <0.2   ALKPHOS 111 109 105     Lipids:   Lab Results   Component Value Date/Time    CHOL 293 09/11/2018 04:40 PM    HDL 69 09/11/2018 04:40 PM    TRIG 294 09/11/2018 04:40 PM     Hemoglobin A1C: No results found for: \"LABA1C\"  TSH:   Lab Results   Component Value Date/Time    TSH 1.17 09/11/2018 04:40 PM     Troponin: No results found for: \"TROPONINT\"  Lactic Acid: No results for input(s): \"LACTA\" in the last 72 hours.  BNP: No results for input(s): \"PROBNP\" in the last 72 hours.  UA:  Lab Results   Component Value Date/Time    NITRU Negative 02/10/2024 06:27 PM    COLORU Yellow 02/10/2024 06:27 PM    PHUR 6.5 02/10/2024 06:27 PM    PHUR 6.0 02/10/2024 06:27 PM    LABCAST 0-1 Hyaline 09/17/2015 02:50 PM    WBCUA 6-10 09/17/2015 02:50 PM    RBCUA None seen 09/17/2015 02:50 PM    MUCUS 3+ 10/25/2010 09:30 AM    BACTERIA 2+ 10/25/2010 09:30 AM    CLARITYU Clear 02/10/2024 06:27 PM    SPECGRAV 1.025 02/10/2024 06:27 PM    LEUKOCYTESUR Negative 02/10/2024 06:27 PM    UROBILINOGEN 0.2 02/10/2024 06:27 PM    BILIRUBINUR Negative 02/10/2024 06:27 PM    BILIRUBINUR NEGATIVE 10/25/2010 09:30 AM    BLOODU Negative 02/10/2024 06:27 PM    GLUCOSEU Negative 02/10/2024 06:27 PM    GLUCOSEU NEGATIVE 10/25/2010 09:30 AM    KETUA Negative 02/10/2024 06:27 PM     Urine Cultures: No results found for: \"LABURIN\"  Blood Cultures: No results found for: \"BC\"  No results found for: \"BLOODCULT2\"  Organism: No results found for: \"ORG\"      Electronically signed by Sunita Renee MD on 2/13/2024 at 8:33 AM

## 2024-02-13 NOTE — CARE COORDINATION
CM  following for d/c planning:    Patient plans to return home w/  dgtr , KAYCEE  and agreeable to  [facility]  ,      Pt  being followed by Plastic and General Surgery:     -  On 2/12, the patient underwent right leg wound debridement with wound vac application.   -   Plastic surgery was consulted at this time for wound closure.    -  Will plan for split-thickness skin graft on Thursday, 2/15/24  -  Continue irrigating wound vac.      CM  will need  3M KCI  Wound Vac  auth paper work completed : if  [place]  is  needed  at  d/c . ( Copy placed on chart  .)  CM  cont to follow and  assist with  d/c planning  needs  .    Electronically signed by Susan Copeland RN on 2/13/2024 at 9:13 AM      Susan Copeland RN Case Manager  [facility]  [address]  [facility] [id number] [phone]  Fax [phone]

## 2024-02-13 NOTE — BH NOTE
Psychiatric consult complete. Chart was reviewed and patient was seen.     Guero was alert and oriented x4, pleasant during interactions. She endorses depression but denies recent and current SI/plan/intent. She denies AVH and does not present as manic or psychotic.     Guero does not require inpatient psychiatric admission. She was previously established with [facility] and is interested in getting reestablished with them. Was previously on Seroquel and found it helpful.     Full psychiatric consult note to follow. Psychiatry will sign off at this time.      Thank you for consult. Please do not hesitate to contact provider if there are additional questions regarding patient.    Judie Helms, MPH, PMHNP-BC  02/13/24

## 2024-02-13 NOTE — PROGRESS NOTES
General Surgery   Daily Progress Note  Patient: Guero Bekcwith      CC: RLE wound    SUBJECTIVE:   Afebrile and hemodynamically stable. No acute events overnight. Ambulating. Tolerating diet. Voiding. Pain has improved to RLE.     ROS:   A 14 point review of systems was conducted, significant findings as noted above. All other systems negative.    OBJECTIVE:    PHYSICAL EXAM:    Vitals:    02/12/24 2313 02/13/24 0011 02/13/24 0018 02/13/24 0337   BP:  116/72  128/75   Pulse:  62  72   Resp: 16 16  14   Temp:  97.8 °F (36.6 °C)  97.9 °F (36.6 °C)   TempSrc:  Oral  Oral   SpO2:  100%  97%   Weight:   88.5 kg (195 lb)    Height:   1.651 m (5' 5\")        General appearance: alert, no acute distress  Eyes: No scleral icterus, EOM grossly intact  Neck: trachea midline, no JVD, neck supple  Chest/Lungs: normal effort with no accessory muscle use, no signs of respiratory distress, on RA  Cardiovascular: RRR   Abdomen: Soft, non-tender, non-distended, no rebound, guarding, or rigidity.  Skin: warm and dry, no rashes  Extremities: no edema, no cyanosis, RLE instilling wound vac in place with good seal, no surrounding erythema, serous output in cannister  Neuro: A&Ox3, no focal deficits, sensation intact    LABS:   Recent Labs     02/11/24  0812 02/12/24  0811   WBC 6.3 5.9   HGB 8.9* 9.0*   HCT 27.8* 28.4*   MCV 76.3* 75.7*    316        Recent Labs     02/12/24  0811 02/13/24  0526    137   K 3.5 3.1*    105   CO2 24 23   PHOS 2.1* 3.2   BUN 7 6*   CREATININE 0.8 0.9        Recent Labs     02/11/24  0812 02/12/24  0811   AST 16 12*   ALT 11 9*   BILITOT 0.3 <0.2   ALKPHOS 109 105      No results for input(s): \"LIPASE\", \"AMYLASE\" in the last 72 hours.     Recent Labs     02/11/24  0812 02/12/24  0811   PROT 6.4 6.0*   INR  --  0.98      No results for input(s): \"CKTOTAL\", \"CKMB\", \"CKMBINDEX\", \"TROPONINI\" in the last 72 hours.      ASSESSMENT & PLAN:   This is a 48 y.o. female with a diagnosis of right leg

## 2024-02-13 NOTE — PLAN OF CARE
Problem: Pain  Goal: Verbalizes/displays adequate comfort level or baseline comfort level  Outcome: Progressing  Patient being medicated with oxycodone as needed with relief.      Problem: Skin/Tissue Integrity  Goal: Absence of new skin breakdown  Description: 1.  Monitor for areas of redness and/or skin breakdown  Outcome: Progressing   Wound VAC in place.  Seal intact.

## 2024-02-13 NOTE — DISCHARGE INSTRUCTIONS
concerns.    Activities  - Sleep in a manner that ensures that you do not apply pressure to the area with the dressing.    - Driving is prohibited for 1 to 2 weeks. Do not drive while taking pain medications.   - Avoid heavy lifting (no more than 5 pounds) and vigorous activity for the first 3 weeks.   - Do not engage in sports, aerobics, or vacuuming.   - Smoking (or ANY nicotine containing product) is prohibited for a minimum of 6 weeks as it interferes with healing and can lead to significant - and avoidable - complications    Diet  - Resume your preoperative diet as tolerated.     Pain Control/Medications  - You will receive a prescription for pain medication that can be taken as directed if needed for pain control. The pain medication may cause constipation and does impair your ability to drive or make important decisions.        - Additionally do NOT take any of the following products:    Aspirin/low-dose aspirin    Ibuprofen (Advil®, Motrin®    Naprosyn or Naproxen (Aleve®)    Vitamin E (even small amounts in multiple vitamins)    Herbals or homeopathic medicines or green tea    Growth hormone    Diet pills (Meridia®, Metabolife®, etc)         - You may also be prescribed an antibiotic, make sure to complete the prescribed course. You can also take a probiotic yogurt (Activia®) to help with your bowel function while on these medications.    Wound Care  Please leave your Prevena wound VAC charged. If there is any concern regarding your Prevena, please call the office [listed below]. Keep Prevena wound VAC dry.  You may place Bacitracin to R thigh donor site and leave open to air. If there is drainage from donor site, you may place gauze over area.     Follow-up  Call your doctor's office at the first sign of:  Excessive worsening pain.   Bleeding under the vac dressing.   Redness, drainage or odor from the vac.   Fever or chills.   Shortness of breath.     Do not hesitate to call if you have any questions or  concerns

## 2024-02-14 ENCOUNTER — ANESTHESIA EVENT (OUTPATIENT)
Dept: OPERATING ROOM | Age: 49
End: 2024-02-14
Payer: COMMERCIAL

## 2024-02-14 LAB
ALBUMIN SERPL-MCNC: 3.2 G/DL (ref 3.4–5)
ANION GAP SERPL CALCULATED.3IONS-SCNC: 9 MMOL/L (ref 3–16)
BASOPHILS # BLD: 0 K/UL (ref 0–0.2)
BASOPHILS NFR BLD: 0.5 %
BUN SERPL-MCNC: 7 MG/DL (ref 7–20)
CALCIUM SERPL-MCNC: 8.5 MG/DL (ref 8.3–10.6)
CHLORIDE SERPL-SCNC: 104 MMOL/L (ref 99–110)
CO2 SERPL-SCNC: 27 MMOL/L (ref 21–32)
CREAT SERPL-MCNC: 0.8 MG/DL (ref 0.6–1.1)
DEPRECATED RDW RBC AUTO: 18.4 % (ref 12.4–15.4)
EOSINOPHIL # BLD: 1.2 K/UL (ref 0–0.6)
EOSINOPHIL NFR BLD: 20.5 %
EST. AVERAGE GLUCOSE BLD GHB EST-MCNC: 111.2 MG/DL
GFR SERPLBLD CREATININE-BSD FMLA CKD-EPI: >60 ML/MIN/{1.73_M2}
GLUCOSE BLD-MCNC: 84 MG/DL (ref 70–99)
GLUCOSE SERPL-MCNC: 67 MG/DL (ref 70–99)
HBA1C MFR BLD: 5.5 %
HCG UR QL: NEGATIVE
HCT VFR BLD AUTO: 28 % (ref 36–48)
HGB BLD-MCNC: 9.1 G/DL (ref 12–16)
LYMPHOCYTES # BLD: 1.8 K/UL (ref 1–5.1)
LYMPHOCYTES NFR BLD: 30.4 %
MAGNESIUM SERPL-MCNC: 1.9 MG/DL (ref 1.8–2.4)
MCH RBC QN AUTO: 24.4 PG (ref 26–34)
MCHC RBC AUTO-ENTMCNC: 32.5 G/DL (ref 31–36)
MCV RBC AUTO: 75.1 FL (ref 80–100)
MONOCYTES # BLD: 0.5 K/UL (ref 0–1.3)
MONOCYTES NFR BLD: 8.5 %
NEUTROPHILS # BLD: 2.3 K/UL (ref 1.7–7.7)
NEUTROPHILS NFR BLD: 40.1 %
PERFORMED ON: NORMAL
PHOSPHATE SERPL-MCNC: 2.5 MG/DL (ref 2.5–4.9)
PLATELET # BLD AUTO: 305 K/UL (ref 135–450)
PMV BLD AUTO: 7 FL (ref 5–10.5)
POTASSIUM SERPL-SCNC: 3.5 MMOL/L (ref 3.5–5.1)
PREALB SERPL-MCNC: 13.1 MG/DL (ref 20–40)
RBC # BLD AUTO: 3.73 M/UL (ref 4–5.2)
SODIUM SERPL-SCNC: 140 MMOL/L (ref 136–145)
TRANSFERRIN SERPL-MCNC: 313 MG/DL (ref 200–360)
WBC # BLD AUTO: 5.8 K/UL (ref 4–11)

## 2024-02-14 PROCEDURE — 6370000000 HC RX 637 (ALT 250 FOR IP): Performed by: NURSE PRACTITIONER

## 2024-02-14 PROCEDURE — 2580000003 HC RX 258

## 2024-02-14 PROCEDURE — 36415 COLL VENOUS BLD VENIPUNCTURE: CPT

## 2024-02-14 PROCEDURE — 83735 ASSAY OF MAGNESIUM: CPT

## 2024-02-14 PROCEDURE — 6360000002 HC RX W HCPCS: Performed by: STUDENT IN AN ORGANIZED HEALTH CARE EDUCATION/TRAINING PROGRAM

## 2024-02-14 PROCEDURE — 84703 CHORIONIC GONADOTROPIN ASSAY: CPT

## 2024-02-14 PROCEDURE — 84134 ASSAY OF PREALBUMIN: CPT

## 2024-02-14 PROCEDURE — 6370000000 HC RX 637 (ALT 250 FOR IP)

## 2024-02-14 PROCEDURE — 99231 SBSQ HOSP IP/OBS SF/LOW 25: CPT | Performed by: SURGERY

## 2024-02-14 PROCEDURE — 2580000003 HC RX 258: Performed by: STUDENT IN AN ORGANIZED HEALTH CARE EDUCATION/TRAINING PROGRAM

## 2024-02-14 PROCEDURE — 85025 COMPLETE CBC W/AUTO DIFF WBC: CPT

## 2024-02-14 PROCEDURE — 80069 RENAL FUNCTION PANEL: CPT

## 2024-02-14 PROCEDURE — 84466 ASSAY OF TRANSFERRIN: CPT

## 2024-02-14 PROCEDURE — 6360000002 HC RX W HCPCS

## 2024-02-14 PROCEDURE — 1200000000 HC SEMI PRIVATE

## 2024-02-14 RX ORDER — SODIUM CHLORIDE, SODIUM LACTATE, POTASSIUM CHLORIDE, CALCIUM CHLORIDE 600; 310; 30; 20 MG/100ML; MG/100ML; MG/100ML; MG/100ML
INJECTION, SOLUTION INTRAVENOUS CONTINUOUS
Status: DISCONTINUED | OUTPATIENT
Start: 2024-02-15 | End: 2024-02-16

## 2024-02-14 RX ADMIN — FERROUS SULFATE TAB 325 MG (65 MG ELEMENTAL FE) 325 MG: 325 (65 FE) TAB at 16:37

## 2024-02-14 RX ADMIN — SODIUM CHLORIDE, PRESERVATIVE FREE 10 ML: 5 INJECTION INTRAVENOUS at 19:42

## 2024-02-14 RX ADMIN — OXYCODONE 10 MG: 5 TABLET ORAL at 12:41

## 2024-02-14 RX ADMIN — PANTOPRAZOLE SODIUM 40 MG: 40 TABLET, DELAYED RELEASE ORAL at 05:44

## 2024-02-14 RX ADMIN — AMPICILLIN SODIUM AND SULBACTAM SODIUM 3000 MG: 2; 1 INJECTION, POWDER, FOR SOLUTION INTRAMUSCULAR; INTRAVENOUS at 00:49

## 2024-02-14 RX ADMIN — AMPICILLIN SODIUM AND SULBACTAM SODIUM 3000 MG: 2; 1 INJECTION, POWDER, FOR SOLUTION INTRAMUSCULAR; INTRAVENOUS at 05:44

## 2024-02-14 RX ADMIN — AMPICILLIN SODIUM AND SULBACTAM SODIUM 3000 MG: 2; 1 INJECTION, POWDER, FOR SOLUTION INTRAMUSCULAR; INTRAVENOUS at 12:37

## 2024-02-14 RX ADMIN — QUETIAPINE FUMARATE 50 MG: 25 TABLET ORAL at 19:42

## 2024-02-14 RX ADMIN — FERROUS SULFATE TAB 325 MG (65 MG ELEMENTAL FE) 325 MG: 325 (65 FE) TAB at 08:26

## 2024-02-14 RX ADMIN — OXYCODONE 10 MG: 5 TABLET ORAL at 16:43

## 2024-02-14 RX ADMIN — ENOXAPARIN SODIUM 40 MG: 100 INJECTION SUBCUTANEOUS at 08:26

## 2024-02-14 RX ADMIN — CEFEPIME 2000 MG: 2 INJECTION, POWDER, FOR SOLUTION INTRAVENOUS at 16:39

## 2024-02-14 RX ADMIN — OXYCODONE 10 MG: 5 TABLET ORAL at 05:44

## 2024-02-14 ASSESSMENT — PAIN SCALES - GENERAL
PAINLEVEL_OUTOF10: 9
PAINLEVEL_OUTOF10: 8
PAINLEVEL_OUTOF10: 7
PAINLEVEL_OUTOF10: 3

## 2024-02-14 ASSESSMENT — PAIN DESCRIPTION - LOCATION
LOCATION: LEG

## 2024-02-14 ASSESSMENT — PAIN DESCRIPTION - ORIENTATION
ORIENTATION: RIGHT

## 2024-02-14 ASSESSMENT — PAIN DESCRIPTION - PAIN TYPE: TYPE: SURGICAL PAIN;ACUTE PAIN

## 2024-02-14 ASSESSMENT — PAIN DESCRIPTION - ONSET: ONSET: ON-GOING

## 2024-02-14 ASSESSMENT — PAIN - FUNCTIONAL ASSESSMENT: PAIN_FUNCTIONAL_ASSESSMENT: ACTIVITIES ARE NOT PREVENTED

## 2024-02-14 ASSESSMENT — PAIN DESCRIPTION - FREQUENCY: FREQUENCY: INTERMITTENT

## 2024-02-14 ASSESSMENT — PAIN DESCRIPTION - DESCRIPTORS
DESCRIPTORS: ACHING;DISCOMFORT
DESCRIPTORS: SHARP
DESCRIPTORS: ACHING

## 2024-02-14 NOTE — PROGRESS NOTES
V2.0    [facility] Progress Note      Name:  Guero Beckwith /Age/Sex: 1975  (48 y.o. female)   MRN & CSN:  [id number] & [id number] Encounter Date/Time: 2024 9:29 AM EST   Location:  [facility]- PCP: Lydia Grider PA-C     Attending:Sunita Renee MD       Hospital Day: 5    Assessment and Recommendations   Guero Beckwith is a 48 y.o. female with pmh of multiple surgeries for hernia, bipolar disorder, GERD who was transferred from outside hospital for anemia and leg wound.  Patient's workup revealed iron deficiency anemia.  Surgery was consulted for the right lower leg wound underwent bedside debridement on 2/10/2024 went to the OR for debridement on 2020.  Excision of skin subcutaneous tissue muscle and fascia done with wound VAC placement      Plan:     Wound on the right lower leg-for possible skin grafting in 2 days  --S/p debridement on 2/10/2024.  Excision of skin subcutaneous tissue muscle and fascia with wound VAC placement done on 2024  -Surgery consulted appreciated  -Plastic surgery consult appreciated  -Unasyn day 3  -CT tibia shows ulcer with soft tissue swelling no involvement of bone  -wound c/s and biopsy sent from the OR-positive for Enterobacter     Anemia-iron deficient  -Start iron supplement      Hypokalemia  -Replace and monitor    Hypomagnesemia  -The patient    Ileostomy  -Local care     Past history of bipolar disorder  -At present not on medication  -Discussed with psychiatry.  Possibly start low-dose dose Seroquel and follow-up [facility]     GERD  -On PPI     Tobacco abuse  -Patient states she smokes off-and-on, declines nicotine patch    Polysubstance abuse  - tox screen + for amphetamine    Diet ADULT DIET; Regular  ADULT ORAL NUTRITION SUPPLEMENT; Breakfast, Lunch, Dinner; Standard High Calorie/High Protein Oral Supplement   DVT Prophylaxis [x] Lovenox, []  Heparin, [] SCDs, [] Ambulation,  [] Eliquis, [] Xarelto  [] Coumadin   Code Status Full Code

## 2024-02-14 NOTE — PROGRESS NOTES
General Surgery   Daily Progress Note  Patient: Guero Beckwith      CC: RLE wound    SUBJECTIVE:   Afebrile and hemodynamically stable. No acute events overnight. Patient is doing well. Denies pain in RLE. Denies fever and chills.     ROS:   A 14 point review of systems was conducted, significant findings as noted above. All other systems negative.    OBJECTIVE:    PHYSICAL EXAM:    Vitals:    02/13/24 2034 02/13/24 2122 02/14/24 0049 02/14/24 0537   BP: 128/74  (!) 105/55 109/65   Pulse: 78  70 76   Resp:  16 14 14   Temp: 98.8 °F (37.1 °C)  98.8 °F (37.1 °C) 99 °F (37.2 °C)   TempSrc: Oral  Oral Oral   SpO2: 99%  97% 96%   Weight:       Height:           General appearance: alert, no acute distress  Eyes: No scleral icterus, EOM grossly intact  Neck: trachea midline, no JVD, neck supple  Chest/Lungs: normal effort with no accessory muscle use, no signs of respiratory distress, on RA  Cardiovascular: RRR   Abdomen: Soft, non-tender, non-distended, no rebound, guarding, or rigidity.  Skin: warm and dry, no rashes  Extremities: no edema, no cyanosis, RLE instilling wound vac in place with good seal, no surrounding erythema, serous output in cannister  Neuro: A&Ox3, no focal deficits, sensation intact    LABS:   Recent Labs     02/12/24  0811 02/13/24  0521   WBC 5.9 6.5   HGB 9.0* 8.6*   HCT 28.4* 27.3*   MCV 75.7* 74.7*    311          Recent Labs     02/12/24  0811 02/13/24  0526    137   K 3.5 3.1*    105   CO2 24 23   PHOS 2.1* 3.2   BUN 7 6*   CREATININE 0.8 0.9          Recent Labs     02/11/24  0812 02/12/24  0811   AST 16 12*   ALT 11 9*   BILITOT 0.3 <0.2   ALKPHOS 109 105        No results for input(s): \"LIPASE\", \"AMYLASE\" in the last 72 hours.     Recent Labs     02/11/24  0812 02/12/24  0811   PROT 6.4 6.0*   INR  --  0.98        No results for input(s): \"CKTOTAL\", \"CKMB\", \"CKMBINDEX\", \"TROPONINI\" in the last 72 hours.      ASSESSMENT & PLAN:   This is a 48 y.o. female with a diagnosis

## 2024-02-14 NOTE — PROGRESS NOTES
Pharmacy Note - Extended Infusion Beta-Lactam Adjustment    Cefepime 2000mg Q8h for treatment of Surgical prophylaxis and skin and soft tissue infection. Per [facility] Extended Infusion Beta-Lactam Policy, cefepime will be changed to 2000mg load followed by 2000mg Q12h extended infusion.    Estimated Creatinine Clearance: Estimated Creatinine Clearance: 92 mL/min (based on SCr of 0.8 mg/dL).    Dialysis Status, BRIT, CKD: n/a    BMI: Body mass index is 32.45 kg/m².    Rationale for Adjustment: Agent is renally eliminated and demonstrates time-dependent effect on bacterial eradication. Extended-infusion dosing strategy aims to enhance microbiologic and clinical efficacy.    Pharmacy will continue to monitor renal function, cultures and sensitivities (where available) and adjust dose as necessary.      Please call with any questions.    Thank you,    Louann Garcia [facility]

## 2024-02-14 NOTE — PROGRESS NOTES
PRE-OP NOTE  Department of Surgery      Chief Complaint or Reason for Surgery: RLE wound    Procedure: Right lower extremity wound split-thickness skin graft   Expected time: 2/15/24, add-on    Plan  1. Diet: NPO at midnight  2. IVF: LR 100cc/hr  3. Antibiotics: scheduled Cefepime 2g q8hr  4. Labs to be drawn: Type and Screen, INR  5. Anesthesia: to see patient  6. Consent: to be obtained, placed in chart  7. Pulmonary: CXR: n/a  8. Cardiac: EKG: n/a  9. Pregnancy test: UPT pending  10. Prophylaxis: Ok for DVT chemoprophylaxis on morning of surgery    Cooper Jurado IV, MD  General Surgery, PGY-3  02/14/24  2:39 PM  [phone]

## 2024-02-14 NOTE — PROGRESS NOTES
Surgery Daily Progress Note      CC: R calf wound    SUBJECTIVE:  No acute events overnight.  Remains afebrile, hemodynamically stable.  Pain well-controlled at this time.  Tolerating diet without nausea/vomiting.  No new complaints this morning.    ROS:   A 14 point review of systems was conducted, significant findings as noted above. All other systems negative.      OBJECTIVE:    PHYSICAL EXAM:  Vitals:    02/13/24 2034 02/13/24 2122 02/14/24 0049 02/14/24 0537   BP: 128/74  (!) 105/55 109/65   Pulse: 78  70 76   Resp:  16 14 14   Temp: 98.8 °F (37.1 °C)  98.8 °F (37.1 °C) 99 °F (37.2 °C)   TempSrc: Oral  Oral Oral   SpO2: 99%  97% 96%   Weight:       Height:           General appearance: Alert, no acute distress  HEENT: NC/AT, No scleral icterus, EOM grossly intact  Neck: Trachea midline, no JVD  Chest/Lungs: Normal effort with no accessory muscle use on RA  Cardiovascular: RRR, well perfused  Abdomen: Soft, non-tender, non-distended, no rebound, guarding, or rigidity.  Skin: Warm and dry, no rashes. Several wounds  Extremities: No edema, no cyanosis. RLE medial calf with irrigating wound vac in place with good seal. Several excoriations along leg.   Neuro: A&Ox3, no focal deficits, sensation intact      ASSESSMENT & PLAN:   Guero Beckwith is a 48 y.o. female for whom our service has been consulted for closure of a right calf wound following excisional debridement in OR on 2/12, POD #2.    - Plan for OR tomorrow for STSG    Cooper Jurado IV, MD  General Surgery, PGY-3  02/14/24  6:13 AM  [phone]

## 2024-02-14 NOTE — PROGRESS NOTES
Surgery Daily Progress Note      CC: R calf wound    SUBJECTIVE:  No acute events overnight.  Remains afebrile, hemodynamically stable.  Pain well-controlled at this time.  Tolerating diet without nausea/vomiting.  No new complaints this morning.    ROS:   A 14 point review of systems was conducted, significant findings as noted above. All other systems negative.      OBJECTIVE:    PHYSICAL EXAM:  Vitals:    02/13/24 2034 02/13/24 2122 02/14/24 0049 02/14/24 0537   BP: 128/74  (!) 105/55 109/65   Pulse: 78  70 76   Resp:  16 14 14   Temp: 98.8 °F (37.1 °C)  98.8 °F (37.1 °C) 99 °F (37.2 °C)   TempSrc: Oral  Oral Oral   SpO2: 99%  97% 96%   Weight:       Height:           General appearance: Alert, no acute distress  HEENT: NC/AT, No scleral icterus, EOM grossly intact  Neck: Trachea midline, no JVD  Chest/Lungs: Normal effort with no accessory muscle use on RA  Cardiovascular: RRR, well perfused  Abdomen: Soft, non-tender, non-distended, no rebound, guarding, or rigidity.  Skin: Warm and dry, no rashes. Several wounds  Extremities: No edema, no cyanosis. RLE medial calf with irrigating wound vac in place with good seal. Several excoriations along leg.   Neuro: A&Ox3, no focal deficits, sensation intact      ASSESSMENT & PLAN:   Guero Beckwith is a 48 y.o. female for whom our service has been consulted for closure of a right calf wound following excisional debridement in OR on 2/12, POD #2.    - RLE wound hemostatic  - wound vac holding. Plastics following for closure options      Silvestre Crespo CNP  2/14/2024  6:35 AM  perfectserve

## 2024-02-14 NOTE — CARE COORDINATION
Patient will go to surgery tomorrow. Unclear if needing wound vac after surgery. CM will follow. [facility] wound vac order form has been placed on chart with sticky note for surgical staff to fill out if needed. Plan to go home with [facility]. Electronically signed by Nyasia Hennessy RN on 2/14/2024 at 4:04 PM

## 2024-02-14 NOTE — PROGRESS NOTES
Consulted for existing colostomy. Appliance changed last night. Supplies given; per pt no other needs. Please call Ostomy RN for questions or concerns.

## 2024-02-15 ENCOUNTER — ANESTHESIA (OUTPATIENT)
Dept: OPERATING ROOM | Age: 49
End: 2024-02-15
Payer: COMMERCIAL

## 2024-02-15 LAB
ABO + RH BLD: NORMAL
ALBUMIN SERPL-MCNC: 3 G/DL (ref 3.4–5)
ANION GAP SERPL CALCULATED.3IONS-SCNC: 7 MMOL/L (ref 3–16)
BACTERIA SPEC AEROBE CULT: NORMAL
BASOPHILS # BLD: 0 K/UL (ref 0–0.2)
BASOPHILS NFR BLD: 0.6 %
BLD GP AB SCN SERPL QL: NORMAL
BUN SERPL-MCNC: 12 MG/DL (ref 7–20)
CALCIUM SERPL-MCNC: 8.5 MG/DL (ref 8.3–10.6)
CHLORIDE SERPL-SCNC: 101 MMOL/L (ref 99–110)
CO2 SERPL-SCNC: 27 MMOL/L (ref 21–32)
CREAT SERPL-MCNC: 0.8 MG/DL (ref 0.6–1.1)
DEPRECATED RDW RBC AUTO: 18.7 % (ref 12.4–15.4)
EOSINOPHIL # BLD: 1.1 K/UL (ref 0–0.6)
EOSINOPHIL NFR BLD: 16.4 %
GFR SERPLBLD CREATININE-BSD FMLA CKD-EPI: >60 ML/MIN/{1.73_M2}
GLUCOSE SERPL-MCNC: 88 MG/DL (ref 70–99)
GRAM STN SPEC: NORMAL
HCT VFR BLD AUTO: 28.3 % (ref 36–48)
HGB BLD-MCNC: 8.9 G/DL (ref 12–16)
INR PPP: 0.89 (ref 0.84–1.16)
LYMPHOCYTES # BLD: 2 K/UL (ref 1–5.1)
LYMPHOCYTES NFR BLD: 31.4 %
MAGNESIUM SERPL-MCNC: 1.8 MG/DL (ref 1.8–2.4)
MCH RBC QN AUTO: 24.1 PG (ref 26–34)
MCHC RBC AUTO-ENTMCNC: 31.5 G/DL (ref 31–36)
MCV RBC AUTO: 76.6 FL (ref 80–100)
MONOCYTES # BLD: 0.5 K/UL (ref 0–1.3)
MONOCYTES NFR BLD: 7.8 %
NEUTROPHILS # BLD: 2.8 K/UL (ref 1.7–7.7)
NEUTROPHILS NFR BLD: 43.8 %
PHOSPHATE SERPL-MCNC: 3 MG/DL (ref 2.5–4.9)
PLATELET # BLD AUTO: 313 K/UL (ref 135–450)
PMV BLD AUTO: 7.3 FL (ref 5–10.5)
POTASSIUM SERPL-SCNC: 4.3 MMOL/L (ref 3.5–5.1)
PROTHROMBIN TIME: 12.1 SEC (ref 11.5–14.8)
RBC # BLD AUTO: 3.7 M/UL (ref 4–5.2)
SODIUM SERPL-SCNC: 135 MMOL/L (ref 136–145)
WBC # BLD AUTO: 6.4 K/UL (ref 4–11)

## 2024-02-15 PROCEDURE — 6370000000 HC RX 637 (ALT 250 FOR IP)

## 2024-02-15 PROCEDURE — 6360000002 HC RX W HCPCS: Performed by: SURGERY

## 2024-02-15 PROCEDURE — 0HRKX74 REPLACEMENT OF RIGHT LOWER LEG SKIN WITH AUTOLOGOUS TISSUE SUBSTITUTE, PARTIAL THICKNESS, EXTERNAL APPROACH: ICD-10-PCS | Performed by: SURGERY

## 2024-02-15 PROCEDURE — 0HBHXZZ EXCISION OF RIGHT UPPER LEG SKIN, EXTERNAL APPROACH: ICD-10-PCS | Performed by: SURGERY

## 2024-02-15 PROCEDURE — 86850 RBC ANTIBODY SCREEN: CPT

## 2024-02-15 PROCEDURE — 6360000002 HC RX W HCPCS: Performed by: NURSE ANESTHETIST, CERTIFIED REGISTERED

## 2024-02-15 PROCEDURE — 15100 SPLT AGRFT T/A/L 1ST 100SQCM: CPT | Performed by: SURGERY

## 2024-02-15 PROCEDURE — 3600000004 HC SURGERY LEVEL 4 BASE: Performed by: SURGERY

## 2024-02-15 PROCEDURE — 36415 COLL VENOUS BLD VENIPUNCTURE: CPT

## 2024-02-15 PROCEDURE — 3600000014 HC SURGERY LEVEL 4 ADDTL 15MIN: Performed by: SURGERY

## 2024-02-15 PROCEDURE — 86901 BLOOD TYPING SEROLOGIC RH(D): CPT

## 2024-02-15 PROCEDURE — 1200000000 HC SEMI PRIVATE

## 2024-02-15 PROCEDURE — 85610 PROTHROMBIN TIME: CPT

## 2024-02-15 PROCEDURE — 80069 RENAL FUNCTION PANEL: CPT

## 2024-02-15 PROCEDURE — C9290 INJ, BUPIVACAINE LIPOSOME: HCPCS | Performed by: SURGERY

## 2024-02-15 PROCEDURE — 6360000002 HC RX W HCPCS: Performed by: STUDENT IN AN ORGANIZED HEALTH CARE EDUCATION/TRAINING PROGRAM

## 2024-02-15 PROCEDURE — 2580000003 HC RX 258: Performed by: STUDENT IN AN ORGANIZED HEALTH CARE EDUCATION/TRAINING PROGRAM

## 2024-02-15 PROCEDURE — 86900 BLOOD TYPING SEROLOGIC ABO: CPT

## 2024-02-15 PROCEDURE — 2580000003 HC RX 258: Performed by: NURSE ANESTHETIST, CERTIFIED REGISTERED

## 2024-02-15 PROCEDURE — 3700000001 HC ADD 15 MINUTES (ANESTHESIA): Performed by: SURGERY

## 2024-02-15 PROCEDURE — 83735 ASSAY OF MAGNESIUM: CPT

## 2024-02-15 PROCEDURE — 2500000003 HC RX 250 WO HCPCS: Performed by: NURSE ANESTHETIST, CERTIFIED REGISTERED

## 2024-02-15 PROCEDURE — 6360000002 HC RX W HCPCS: Performed by: ANESTHESIOLOGY

## 2024-02-15 PROCEDURE — 6370000000 HC RX 637 (ALT 250 FOR IP): Performed by: SURGERY

## 2024-02-15 PROCEDURE — 85025 COMPLETE CBC W/AUTO DIFF WBC: CPT

## 2024-02-15 PROCEDURE — 3700000000 HC ANESTHESIA ATTENDED CARE: Performed by: SURGERY

## 2024-02-15 PROCEDURE — 2709999900 HC NON-CHARGEABLE SUPPLY: Performed by: SURGERY

## 2024-02-15 PROCEDURE — 7100000001 HC PACU RECOVERY - ADDTL 15 MIN: Performed by: SURGERY

## 2024-02-15 PROCEDURE — 7100000000 HC PACU RECOVERY - FIRST 15 MIN: Performed by: SURGERY

## 2024-02-15 RX ORDER — MIDAZOLAM HYDROCHLORIDE 1 MG/ML
2 INJECTION INTRAMUSCULAR; INTRAVENOUS
Status: DISCONTINUED | OUTPATIENT
Start: 2024-02-15 | End: 2024-02-15

## 2024-02-15 RX ORDER — LIDOCAINE HYDROCHLORIDE 20 MG/ML
INJECTION, SOLUTION INTRAVENOUS PRN
Status: DISCONTINUED | OUTPATIENT
Start: 2024-02-15 | End: 2024-02-15 | Stop reason: SDUPTHER

## 2024-02-15 RX ORDER — FAMOTIDINE 10 MG/ML
20 INJECTION, SOLUTION INTRAVENOUS ONCE
Status: DISCONTINUED | OUTPATIENT
Start: 2024-02-15 | End: 2024-02-15

## 2024-02-15 RX ORDER — ONDANSETRON 2 MG/ML
INJECTION INTRAMUSCULAR; INTRAVENOUS PRN
Status: DISCONTINUED | OUTPATIENT
Start: 2024-02-15 | End: 2024-02-15 | Stop reason: SDUPTHER

## 2024-02-15 RX ORDER — EPINEPHRINE NASAL SOLUTION 1 MG/ML
SOLUTION NASAL PRN
Status: DISCONTINUED | OUTPATIENT
Start: 2024-02-15 | End: 2024-02-15 | Stop reason: ALTCHOICE

## 2024-02-15 RX ORDER — METOCLOPRAMIDE HYDROCHLORIDE 5 MG/ML
10 INJECTION INTRAMUSCULAR; INTRAVENOUS
Status: DISCONTINUED | OUTPATIENT
Start: 2024-02-15 | End: 2024-02-15

## 2024-02-15 RX ORDER — PROPOFOL 10 MG/ML
INJECTION, EMULSION INTRAVENOUS PRN
Status: DISCONTINUED | OUTPATIENT
Start: 2024-02-15 | End: 2024-02-15 | Stop reason: SDUPTHER

## 2024-02-15 RX ORDER — HYDROMORPHONE HYDROCHLORIDE 1 MG/ML
0.25 INJECTION, SOLUTION INTRAMUSCULAR; INTRAVENOUS; SUBCUTANEOUS EVERY 5 MIN PRN
Status: DISCONTINUED | OUTPATIENT
Start: 2024-02-15 | End: 2024-02-15

## 2024-02-15 RX ORDER — MINERAL OIL
OIL (ML) MISCELLANEOUS PRN
Status: DISCONTINUED | OUTPATIENT
Start: 2024-02-15 | End: 2024-02-15 | Stop reason: ALTCHOICE

## 2024-02-15 RX ORDER — SODIUM CHLORIDE 9 MG/ML
INJECTION, SOLUTION INTRAVENOUS PRN
Status: DISCONTINUED | OUTPATIENT
Start: 2024-02-15 | End: 2024-02-15

## 2024-02-15 RX ORDER — ROCURONIUM BROMIDE 10 MG/ML
INJECTION, SOLUTION INTRAVENOUS PRN
Status: DISCONTINUED | OUTPATIENT
Start: 2024-02-15 | End: 2024-02-15 | Stop reason: SDUPTHER

## 2024-02-15 RX ORDER — LABETALOL HYDROCHLORIDE 5 MG/ML
10 INJECTION, SOLUTION INTRAVENOUS
Status: DISCONTINUED | OUTPATIENT
Start: 2024-02-15 | End: 2024-02-15

## 2024-02-15 RX ORDER — SODIUM CHLORIDE 0.9 % (FLUSH) 0.9 %
5-40 SYRINGE (ML) INJECTION EVERY 12 HOURS SCHEDULED
Status: DISCONTINUED | OUTPATIENT
Start: 2024-02-15 | End: 2024-02-15

## 2024-02-15 RX ORDER — SODIUM CHLORIDE 0.9 % (FLUSH) 0.9 %
5-40 SYRINGE (ML) INJECTION PRN
Status: DISCONTINUED | OUTPATIENT
Start: 2024-02-15 | End: 2024-02-15

## 2024-02-15 RX ORDER — ONDANSETRON 2 MG/ML
4 INJECTION INTRAMUSCULAR; INTRAVENOUS
Status: DISCONTINUED | OUTPATIENT
Start: 2024-02-15 | End: 2024-02-15

## 2024-02-15 RX ORDER — FENTANYL CITRATE 50 UG/ML
50 INJECTION, SOLUTION INTRAMUSCULAR; INTRAVENOUS EVERY 5 MIN PRN
Status: DISCONTINUED | OUTPATIENT
Start: 2024-02-15 | End: 2024-02-15

## 2024-02-15 RX ORDER — FAMOTIDINE 10 MG/ML
INJECTION, SOLUTION INTRAVENOUS PRN
Status: DISCONTINUED | OUTPATIENT
Start: 2024-02-15 | End: 2024-02-15 | Stop reason: SDUPTHER

## 2024-02-15 RX ORDER — FENTANYL CITRATE 50 UG/ML
INJECTION, SOLUTION INTRAMUSCULAR; INTRAVENOUS PRN
Status: DISCONTINUED | OUTPATIENT
Start: 2024-02-15 | End: 2024-02-15 | Stop reason: SDUPTHER

## 2024-02-15 RX ADMIN — SUGAMMADEX 200 MG: 100 INJECTION, SOLUTION INTRAVENOUS at 13:50

## 2024-02-15 RX ADMIN — PROPOFOL 150 MG: 10 INJECTION, EMULSION INTRAVENOUS at 13:20

## 2024-02-15 RX ADMIN — FENTANYL CITRATE 100 MCG: 50 INJECTION, SOLUTION INTRAMUSCULAR; INTRAVENOUS at 13:20

## 2024-02-15 RX ADMIN — CEFEPIME 2000 MG: 2 INJECTION, POWDER, FOR SOLUTION INTRAVENOUS at 03:11

## 2024-02-15 RX ADMIN — SODIUM CHLORIDE, POTASSIUM CHLORIDE, SODIUM LACTATE AND CALCIUM CHLORIDE: 600; 310; 30; 20 INJECTION, SOLUTION INTRAVENOUS at 10:49

## 2024-02-15 RX ADMIN — OXYCODONE 10 MG: 5 TABLET ORAL at 19:44

## 2024-02-15 RX ADMIN — LIDOCAINE HYDROCHLORIDE 100 MG: 20 INJECTION, SOLUTION INTRAVENOUS at 13:20

## 2024-02-15 RX ADMIN — PANTOPRAZOLE SODIUM 40 MG: 40 TABLET, DELAYED RELEASE ORAL at 05:36

## 2024-02-15 RX ADMIN — CEFEPIME 2000 MG: 2 INJECTION, POWDER, FOR SOLUTION INTRAVENOUS at 15:27

## 2024-02-15 RX ADMIN — DEXMEDETOMIDINE HYDROCHLORIDE 8 MCG: 100 INJECTION, SOLUTION INTRAVENOUS at 13:50

## 2024-02-15 RX ADMIN — QUETIAPINE FUMARATE 50 MG: 25 TABLET ORAL at 19:44

## 2024-02-15 RX ADMIN — ONDANSETRON 4 MG: 2 INJECTION INTRAMUSCULAR; INTRAVENOUS at 13:33

## 2024-02-15 RX ADMIN — SODIUM CHLORIDE, POTASSIUM CHLORIDE, SODIUM LACTATE AND CALCIUM CHLORIDE: 600; 310; 30; 20 INJECTION, SOLUTION INTRAVENOUS at 00:15

## 2024-02-15 RX ADMIN — HYDROMORPHONE HYDROCHLORIDE 0.25 MG: 1 INJECTION, SOLUTION INTRAMUSCULAR; INTRAVENOUS; SUBCUTANEOUS at 14:34

## 2024-02-15 RX ADMIN — OXYCODONE 10 MG: 5 TABLET ORAL at 00:27

## 2024-02-15 RX ADMIN — FAMOTIDINE 20 MG: 10 INJECTION, SOLUTION INTRAVENOUS at 13:33

## 2024-02-15 RX ADMIN — DEXMEDETOMIDINE HYDROCHLORIDE 8 MCG: 100 INJECTION, SOLUTION INTRAVENOUS at 13:31

## 2024-02-15 RX ADMIN — FERROUS SULFATE TAB 325 MG (65 MG ELEMENTAL FE) 325 MG: 325 (65 FE) TAB at 18:04

## 2024-02-15 RX ADMIN — ROCURONIUM BROMIDE 50 MG: 10 INJECTION, SOLUTION INTRAVENOUS at 13:20

## 2024-02-15 RX ADMIN — HYDROMORPHONE HYDROCHLORIDE 0.25 MG: 1 INJECTION, SOLUTION INTRAMUSCULAR; INTRAVENOUS; SUBCUTANEOUS at 14:43

## 2024-02-15 RX ADMIN — FERROUS SULFATE TAB 325 MG (65 MG ELEMENTAL FE) 325 MG: 325 (65 FE) TAB at 09:51

## 2024-02-15 ASSESSMENT — PAIN DESCRIPTION - ONSET
ONSET: GRADUAL
ONSET: ON-GOING
ONSET: ON-GOING

## 2024-02-15 ASSESSMENT — PAIN DESCRIPTION - DESCRIPTORS
DESCRIPTORS: SORE
DESCRIPTORS: ACHING
DESCRIPTORS: SORE
DESCRIPTORS: BURNING

## 2024-02-15 ASSESSMENT — PAIN DESCRIPTION - LOCATION
LOCATION: LEG
LOCATION: LEG;INCISION
LOCATION: LEG;INCISION
LOCATION: LEG

## 2024-02-15 ASSESSMENT — PAIN - FUNCTIONAL ASSESSMENT
PAIN_FUNCTIONAL_ASSESSMENT: ACTIVITIES ARE NOT PREVENTED
PAIN_FUNCTIONAL_ASSESSMENT: NONE - DENIES PAIN
PAIN_FUNCTIONAL_ASSESSMENT: PREVENTS OR INTERFERES SOME ACTIVE ACTIVITIES AND ADLS
PAIN_FUNCTIONAL_ASSESSMENT: ACTIVITIES ARE NOT PREVENTED
PAIN_FUNCTIONAL_ASSESSMENT: ACTIVITIES ARE NOT PREVENTED

## 2024-02-15 ASSESSMENT — PAIN DESCRIPTION - PAIN TYPE
TYPE: SURGICAL PAIN;ACUTE PAIN
TYPE: SURGICAL PAIN
TYPE: SURGICAL PAIN

## 2024-02-15 ASSESSMENT — PAIN SCALES - GENERAL
PAINLEVEL_OUTOF10: 5
PAINLEVEL_OUTOF10: 0
PAINLEVEL_OUTOF10: 0
PAINLEVEL_OUTOF10: 10
PAINLEVEL_OUTOF10: 0
PAINLEVEL_OUTOF10: 8
PAINLEVEL_OUTOF10: 8
PAINLEVEL_OUTOF10: 0
PAINLEVEL_OUTOF10: 4

## 2024-02-15 ASSESSMENT — PAIN DESCRIPTION - ORIENTATION
ORIENTATION: RIGHT

## 2024-02-15 ASSESSMENT — PAIN DESCRIPTION - FREQUENCY
FREQUENCY: INTERMITTENT
FREQUENCY: INTERMITTENT
FREQUENCY: CONTINUOUS

## 2024-02-15 NOTE — PLAN OF CARE
Problem: Discharge Planning  Goal: Discharge to home or other facility with appropriate resources  Outcome: Progressing     Problem: Pain  Goal: Verbalizes/displays adequate comfort level or baseline comfort level  Outcome: Progressing  Flowsheets (Taken 2/15/2024 1735)  Verbalizes/displays adequate comfort level or baseline comfort level:   Encourage patient to monitor pain and request assistance   Assess pain using appropriate pain scale   Administer analgesics based on type and severity of pain and evaluate response   Implement non-pharmacological measures as appropriate and evaluate response   Consider cultural and social influences on pain and pain management   Notify Licensed Independent Practitioner if interventions unsuccessful or patient reports new pain     Problem: Skin/Tissue Integrity  Goal: Absence of new skin breakdown  Description: 1.  Monitor for areas of redness and/or skin breakdown  2.  Assess vascular access sites hourly  3.  Every 4-6 hours minimum:  Change oxygen saturation probe site  4.  Every 4-6 hours:  If on nasal continuous positive airway pressure, respiratory therapy assess nares and determine need for appliance change or resting period.  Outcome: Progressing     Problem: ABCDS Injury Assessment  Goal: Absence of physical injury  Outcome: Progressing     Problem: Safety - Adult  Goal: Free from fall injury  Outcome: Progressing  Flowsheets (Taken 2/15/2024 1735)  Free From Fall Injury: Instruct family/caregiver on patient safety  Note: Post-surgery pt on fall precautions. Pt remains free from falls. Bed alarm, gripper socks on. Bedside table and call light within reach. Bed locked and in lowest position. Pt able to use call light appropriately, alert and oriented x4.

## 2024-02-15 NOTE — PROGRESS NOTES
Plastic Surgery Daily Progress Note      CC: R calf wound    SUBJECTIVE:  Afebrile and hemodynamically stable. Denies RLE pain. Ambulating. No questions regarding surgery. Tolerating diet.      ROS:   A 14 point review of systems was conducted, significant findings as noted above. All other systems negative.      OBJECTIVE:    PHYSICAL EXAM:  Vitals:    02/14/24 1938 02/15/24 0027 02/15/24 0057 02/15/24 0311   BP: 114/64   124/82   Pulse: 81   68   Resp: 17 16 16 15   Temp: 98.3 °F (36.8 °C)   98 °F (36.7 °C)   TempSrc: Oral   Oral   SpO2: 99%   98%   Weight:       Height:           General appearance: Alert, no acute distress  HEENT: NC/AT, No scleral icterus, EOM grossly intact  Neck: Trachea midline, no JVD  Chest/Lungs: Normal effort with no accessory muscle use on RA  Cardiovascular: RRR, well perfused  Abdomen: Soft, non-tender, non-distended, no rebound, guarding, or rigidity.  Skin: Warm and dry, no rashes. Several wounds  Extremities: No edema, no cyanosis. RLE medial calf with irrigating wound vac in place with good seal. Several excoriations along leg.   Neuro: A&Ox3, no focal deficits, sensation intact      ASSESSMENT & PLAN:   Guero Beckwith is a 48 y.o. female for whom our service has been consulted for closure of a right calf wound following excisional debridement in OR on 2/12, POD #3.    - Plan for OR today for STSG    Victorino Rodriguez DO  PGY-1, General Surgery Resident  02/15/24 6:14 AM  [phone]

## 2024-02-15 NOTE — PROGRESS NOTES
From OR to PACU.   Post op PROCEDURES  SPLIT THICKNESS SKIN GRAFT RIGHT LOWER EXTREMITY - Righ  Report received from CRNA at bedside.   Dr. Herrera surgical resident on the case.   Patient drowsy- denies any pain or nausea.   RIght anterior thigh with gauze/tegaderm dressing intact.   Right lower inner leg area with black foam dressing/tegaderm to ULTA wound Vac at 125 mmgh as ordered.   Patient has trace edema in lower extremities.   Doppler pulse present.     No needs or concerns at this time.

## 2024-02-15 NOTE — PROGRESS NOTES
V2.0    [facility] Progress Note      Name:  Guero Beckwith /Age/Sex: 1975  (48 y.o. female)   MRN & CSN:  [id number] & [id number] Encounter Date/Time: 2/15/2024 9:29 AM EST   Location:  [facility]- PCP: Lydia Grider PA-C     Attending:Sunita Renee MD       Hospital Day: 6    Assessment and Recommendations   Guero Beckwith is a 48 y.o. female with pmh of multiple surgeries for hernia, bipolar disorder, GERD who was transferred from outside hospital for anemia and leg wound.  Patient's workup revealed iron deficiency anemia.  Surgery was consulted for the right lower leg wound underwent bedside debridement on 2/10/2024 went to the OR for debridement on 2020.  Excision of skin subcutaneous tissue muscle and fascia done with wound VAC placement      Plan:     Wound on the right lower leg-for skin grafting today  --S/p debridement on 2/10/2024.  Excision of skin subcutaneous tissue muscle and fascia with wound VAC placement done on 2024  -Surgery consulted appreciated  -Plastic surgery consult appreciated  -Unasyn day 5  -CT tibia shows ulcer with soft tissue swelling no involvement of bone  -wound c/s and biopsy sent from the OR-positive for Enterobacter     Anemia-iron deficient  -Started on iron supplement      Hypokalemia  -Replace and monitor    Hypomagnesemia  -The patient    Ileostomy  -Local care     Past history of bipolar disorder  -At present not on medication  -Discussed with psychiatry.  Possibly start low-dose dose Seroquel and follow-up [facility]     GERD  -On PPI     Tobacco abuse  -Patient states she smokes off-and-on, declines nicotine patch    Polysubstance abuse  - tox screen + for amphetamine    Diet Diet NPO Exceptions are: Sips of Water with Meds   DVT Prophylaxis [x] Lovenox, []  Heparin, [] SCDs, [] Ambulation,  [] Eliquis, [] Xarelto  [] Coumadin   Code Status Full Code   Disposition From:   Expected Disposition: home  Estimated Date of Discharge: Possibly

## 2024-02-15 NOTE — PLAN OF CARE
Problem: Pain  Goal: Verbalizes/displays adequate comfort level or baseline comfort level  Outcome: Progressing  Flowsheets (Taken 2/15/2024 0127)  Verbalizes/displays adequate comfort level or baseline comfort level:   Encourage patient to monitor pain and request assistance   Assess pain using appropriate pain scale   Administer analgesics based on type and severity of pain and evaluate response   Implement non-pharmacological measures as appropriate and evaluate response   Consider cultural and social influences on pain and pain management   Notify Licensed Independent Practitioner if interventions unsuccessful or patient reports new pain  Note: PRN pain medication given as ordered. Plan of care ongoing.     Problem: Skin/Tissue Integrity  Goal: Absence of new skin breakdown  Description: 1.  Monitor for areas of redness and/or skin breakdown  2.  Assess vascular access sites hourly  3.  Every 4-6 hours minimum:  Change oxygen saturation probe site  4.  Every 4-6 hours:  If on nasal continuous positive airway pressure, respiratory therapy assess nares and determine need for appliance change or resting period.  Outcome: Progressing  Note: Monitoring for new skin breakdown.

## 2024-02-15 NOTE — CARE COORDINATION
CM following for  d/c planning:    Patient will go to surgery today :    Unclear if needing wound vac after surgery.  Likely Prevena :      CM will follow. [facility] wound vac order form has been placed on chart with sticky note for surgical staff to fill out if needed.     Plan to go home with C.  [facility]  following  pt  agreeable.       Electronically signed by Susan Copeland RN on 2/15/2024 at 3:39 PM          Susan Copeland RN Case Manager  [facility] [place] [address]  [facility] [address] [phone]  Fax [phone]

## 2024-02-15 NOTE — ANESTHESIA PRE PROCEDURE
Department of Anesthesiology  Preprocedure Note       Name:  Guero Beckwith   Age:  48 y.o.  :  1975                                          MRN:  [id number]         Date:  2/15/2024      Surgeon: Surgeon(s):  Tommy Whatley MD    Procedure: Procedure(s):  SPLIT THICKNESS SKIN GRAFT RIGHT LOWER EXTREMITY    Medications prior to admission:   Prior to Admission medications    Medication Sig Start Date End Date Taking? Authorizing Provider   pantoprazole (PROTONIX) 20 MG tablet Take 2 tablets by mouth daily    Provider, MD Leonor       Current medications:    Current Facility-Administered Medications   Medication Dose Route Frequency Provider Last Rate Last Admin    lactated ringers IV soln infusion   IntraVENous Continuous Cooper Jurado  mL/hr at 02/15/24 1049 New Bag at 02/15/24 1049    ceFEPIme (MAXIPIME) 2,000 mg in sodium chloride 0.9 % 100 mL IVPB (mini-bag)  2,000 mg IntraVENous Q12H Cooper Jurado MD   Stopped at 02/15/24 0711    QUEtiapine (SEROQUEL) tablet 50 mg  50 mg Oral Nightly Judie Helms APRN - CNP   50 mg at 24    oxyCODONE (ROXICODONE) immediate release tablet 5 mg  5 mg Oral Q4H PRN Victorino Rodriguez DO        Or    oxyCODONE (ROXICODONE) immediate release tablet 10 mg  10 mg Oral Q4H PRN Victorino Rodriguez DO   10 mg at 02/15/24 0027    HYDROmorphone HCl PF (DILAUDID) injection 0.25 mg  0.25 mg IntraVENous Q4H PRN Victorino Rodriguez DO        ferrous sulfate (IRON 325) tablet 325 mg  325 mg Oral BID  Victorino Rodriguez DO   325 mg at 02/15/24 0951    sodium chloride flush 0.9 % injection 5-40 mL  5-40 mL IntraVENous 2 times per day Victorino Rodriguez DO   10 mL at 24    sodium chloride flush 0.9 % injection 5-40 mL  5-40 mL IntraVENous PRN Victorino Rodriguez DO        0.9 % sodium chloride infusion   IntraVENous PRN Victorino Rodriguez DO 5 mL/hr at 24 2359 New Bag at 24 2359    potassium chloride (KLOR-CON M) extended release tablet 40 mEq  40 mEq Oral

## 2024-02-15 NOTE — OP NOTE
[facility] PLASTIC & RECONSTRUCTIVE SURGERY     OPERATIVE DICTATION    NAME: Guero Beckwith   MRN: [id number]  DATE: 2/15/2024     AGE: 48 y.o.    LOCATION: [facility]    PREOPERATIVE DIAGNOSIS:  Right leg wound     POSTOPERATIVE DIAGNOSIS:  Same.     OPERATION PERFORMED: 1) Split thickness skin graft to the right leg (10 x 5 cm)      2) Application of wound vacuum bolster dressing     SURGEON:  Tommy Whatley MD    ASSISTANT: Tatiana Herrera (PGY2)     ANESTHESIA: General     ESTIMATED BLOOD LOSS:  Minimal     DRAINS:  None     SPECIMENS: None     OPERATIVE INDICATIONS:  This is a 48 y.o. female who was admitted to [facility] and underwent excisional debridement after having a necrotic wound on her right calf.  Given the size of the wound, plastic surgery was consulted for evaluation and treatment. A thorough discussion regarding the risks, benefits, alternatives, outcomes, and personnel involved was performed with the patient.  After all questions were answered to the patient's satisfaction, they agreed to proceed.    OPERATIVE PROCEDURE:  The patient was brought to the operating room in her hospital bed and then was placed in the supine position on the operating room table. She underwent general anesthesia and was prepped and draped in the usual sterile manner.  A time-out was performed confirming the patient and the procedure to be performed.  The operation commenced by cleaning the wound with a 15 blade as well as copious irrigation using 3L of saline solution via a Pulse Lavage. Attention was then directed to the right thigh.  A split thickness skin graft was harvested with a Stacia dermatome.  The graft was then meshed in a 1.5:1 manner and sutured into position using 4-0 Chromic sutures.  The donor site had hemostasis obtained with epinephrine soaked Telfa.  Postoperative analgesia was provided with Exparel.  The donor site was then dressed with a Xeroform that was sutured into position followed by a

## 2024-02-15 NOTE — BRIEF OP NOTE
Brief Postoperative Note      Patient: Guero Beckwith  YOB: 1975  MRN: [id number]    Date of Procedure: 2/15/2024    Pre-Op Diagnosis Codes:     * Wound of right lower extremity, subsequent encounter [S81.801D]    Post-Op Diagnosis: Same       Procedure(s):  SPLIT THICKNESS SKIN GRAFT RIGHT LOWER EXTREMITY    Surgeon(s):  Tommy Whatley MD    Assistant:  Resident: Tatiana Herrera DO    Anesthesia: Monitor Anesthesia Care    Estimated Blood Loss (mL): Minimal    Complications: None    Specimens:   * No specimens in log *    Implants:  * No implants in log *      Drains:   Negative Pressure Wound Therapy Leg Lower;Right;Medial (Active)   Dressing Type Black Foam 02/15/24 1345   Dressing Changed Changed/New 02/15/24 1345       Colostomy RLQ (Active)   Stomal Appliance 2 piece;Clean;Dry;Intact 02/15/24 0930   Stoma  Assessment [place] 02/15/24 0311   Peristomal Assessment Clean, dry & intact 02/15/24 0311   Mucocutaneous Junction Intact 02/15/24 0311   Treatment Pouch change;Site care;Tape changed;Stoma paste 02/11/24 0845   Stool Appearance Soft 02/15/24 0930   Stool Color Brown 02/15/24 0930   Stool Amount Medium 02/15/24 0930       [REMOVED] Negative Pressure Wound Therapy Leg Lower;Right (Removed)   Wound Type Acute/Traumatic 02/15/24 0930   Dressing Type Black Foam 02/15/24 0930   Cycle Continuous 02/14/24 1525   Canister changed? Yes 02/14/24 1525   Dressing Status Clean, dry & intact 02/15/24 0930   Output (ml) 350 ml 02/14/24 1525       Findings: Uncomplicated 10.5 cm x 5 cm STSG to RLE wound from R thigh donor site. Prevena settings, 2 checkmarks prior to transfer to PACU      Electronically signed by Tatiana Herrera DO on 2/15/2024 at 2:00 PM

## 2024-02-15 NOTE — PROGRESS NOTES
Department of Surgery:  Post-op Note    CC: RLE wound    Procedure(s) Performed: STSG to R leg, application of wound VAC bolster dressing    Subjective:   Pain is controlled, denies nausea or vomiting. Voiding.    Objective:  Anesthesia type: General      I/O    Intra op    Post op     Fluids  400 mL - mL     EBL - mL 0 mL     Urine - mL x1 mL     Exam:  Vitals:    02/15/24 1430 02/15/24 1434 02/15/24 1446 02/15/24 1510   BP: 110/69 110/69  115/74   Pulse: 69 67 68 70   Resp: 14 11  16   Temp:  97.6 °F (36.4 °C)  97.8 °F (36.6 °C)   TempSrc:    Oral   SpO2: 100% 100%  98%   Weight:       Height:           General appearance: alert, no acute distress, grooming appropriate  Chest/Lungs: No adventitious breath sounds. No increased work of breathing.   Cardiovascular: RRR  Abdomen: non-distended  Skin: no erythema or rashes, no cyanosis  Extremities: Wound VAC on prevena setting with two check marks. Donor site dressing c/d/I  Neuro: A&Ox3, no focal deficits, sensation intact    Assessment and Plan  This is a 48 y.o. year old female s/p STSG to R leg, application of wound VAC bolster dressing POD #0    Pain management: PRN tylenol, dilaudid  Cardiovascular: monitor vitals as scheduled  Respiratory:  encourage hourly incentive spirometry and deep breathing  FEN:  Fluids: , Diet: regular  : Pt reports she has voided s/p surgery. Please continue to monitor  Wound: Per plastic surgery  Ambulation: OOB to chair, encourage ambulation  Prophylaxis: scott Oliveira DO  PGY1, General Surgery  02/15/24   5:49 PM   [employer]  [phone]

## 2024-02-15 NOTE — PROGRESS NOTES
PACU Transfer Note    Vitals:    02/15/24 1446   BP:    Pulse: 68   Resp:    Temp:    SpO2:        In: 400 [I.V.:400]  Out: -     Pain assessment:  none Pain level 4/10 tolerable. VS stable. Called report to Madeline Rn on 6th floor. Patient to return to room # 6302 as scheduled.   Pain Level: 4    Report given to Receiving unit RN.    2/15/2024 2:47 PM

## 2024-02-15 NOTE — ANESTHESIA POSTPROCEDURE EVALUATION
Department of Anesthesiology  Postprocedure Note    Patient: Guero Beckwith  MRN: [id number]  YOB: 1975  Date of evaluation: 2/15/2024    Procedure Summary       Date: 02/15/24 Room / Location: [place] [address] / [facility]    Anesthesia Start: 1315 Anesthesia Stop: 1408    Procedure: SPLIT THICKNESS SKIN GRAFT RIGHT LOWER EXTREMITY (Right) Diagnosis:       Wound of right lower extremity, subsequent encounter      (Wound of right lower extremity, subsequent encounter [S81.801D])    Surgeons: Tommy Whatley MD Responsible Provider: Anirudh Holt MD    Anesthesia Type: MAC ASA Status: 3            Anesthesia Type: No value filed.    Mikki Phase I: Mikki Score: 8    Mikki Phase II:      Anesthesia Post Evaluation    Patient location during evaluation: PACU  Patient participation: complete - patient participated  Level of consciousness: awake and alert  Pain score: 4  Airway patency: patent  Nausea & Vomiting: no nausea and no vomiting  Cardiovascular status: hemodynamically stable  Respiratory status: acceptable  Hydration status: euvolemic  Pain management: adequate    No notable events documented.

## 2024-02-16 VITALS
DIASTOLIC BLOOD PRESSURE: 73 MMHG | BODY MASS INDEX: 32.49 KG/M2 | TEMPERATURE: 97 F | SYSTOLIC BLOOD PRESSURE: 116 MMHG | HEART RATE: 80 BPM | OXYGEN SATURATION: 94 % | HEIGHT: 65 IN | RESPIRATION RATE: 16 BRPM | WEIGHT: 195 LBS

## 2024-02-16 PROCEDURE — 6370000000 HC RX 637 (ALT 250 FOR IP): Performed by: SURGERY

## 2024-02-16 PROCEDURE — 2580000003 HC RX 258

## 2024-02-16 PROCEDURE — 6370000000 HC RX 637 (ALT 250 FOR IP)

## 2024-02-16 PROCEDURE — 6360000002 HC RX W HCPCS

## 2024-02-16 RX ORDER — CIPROFLOXACIN 500 MG/1
500 TABLET, FILM COATED ORAL EVERY 12 HOURS SCHEDULED
Status: DISCONTINUED | OUTPATIENT
Start: 2024-02-16 | End: 2024-02-16 | Stop reason: HOSPADM

## 2024-02-16 RX ORDER — CIPROFLOXACIN 500 MG/1
500 TABLET, FILM COATED ORAL 2 TIMES DAILY
Qty: 14 TABLET | Refills: 0 | Status: SHIPPED | OUTPATIENT
Start: 2024-02-16 | End: 2024-02-23

## 2024-02-16 RX ORDER — QUETIAPINE FUMARATE 50 MG/1
50 TABLET, FILM COATED ORAL NIGHTLY
Qty: 60 TABLET | Refills: 3 | Status: SHIPPED | OUTPATIENT
Start: 2024-02-16

## 2024-02-16 RX ORDER — OXYCODONE HYDROCHLORIDE 5 MG/1
5 TABLET ORAL EVERY 6 HOURS PRN
Qty: 12 TABLET | Refills: 0 | Status: SHIPPED | OUTPATIENT
Start: 2024-02-16 | End: 2024-02-19

## 2024-02-16 RX ORDER — FERROUS SULFATE 325(65) MG
325 TABLET ORAL 2 TIMES DAILY WITH MEALS
Qty: 60 TABLET | Refills: 3 | Status: SHIPPED | OUTPATIENT
Start: 2024-02-16

## 2024-02-16 RX ADMIN — OXYCODONE 10 MG: 5 TABLET ORAL at 05:27

## 2024-02-16 RX ADMIN — SODIUM CHLORIDE, PRESERVATIVE FREE 10 ML: 5 INJECTION INTRAVENOUS at 08:05

## 2024-02-16 RX ADMIN — CEFEPIME 2000 MG: 2 INJECTION, POWDER, FOR SOLUTION INTRAVENOUS at 03:30

## 2024-02-16 RX ADMIN — CIPROFLOXACIN HYDROCHLORIDE 500 MG: 500 TABLET, FILM COATED ORAL at 09:52

## 2024-02-16 RX ADMIN — PANTOPRAZOLE SODIUM 40 MG: 40 TABLET, DELAYED RELEASE ORAL at 05:27

## 2024-02-16 RX ADMIN — ONDANSETRON 4 MG: 4 TABLET, ORALLY DISINTEGRATING ORAL at 09:52

## 2024-02-16 RX ADMIN — ENOXAPARIN SODIUM 40 MG: 100 INJECTION SUBCUTANEOUS at 08:04

## 2024-02-16 RX ADMIN — FERROUS SULFATE TAB 325 MG (65 MG ELEMENTAL FE) 325 MG: 325 (65 FE) TAB at 08:04

## 2024-02-16 ASSESSMENT — PAIN SCALES - GENERAL: PAINLEVEL_OUTOF10: 8

## 2024-02-16 ASSESSMENT — PAIN DESCRIPTION - ORIENTATION: ORIENTATION: RIGHT

## 2024-02-16 ASSESSMENT — PAIN DESCRIPTION - DESCRIPTORS: DESCRIPTORS: BURNING

## 2024-02-16 ASSESSMENT — PAIN - FUNCTIONAL ASSESSMENT: PAIN_FUNCTIONAL_ASSESSMENT: ACTIVITIES ARE NOT PREVENTED

## 2024-02-16 ASSESSMENT — PAIN DESCRIPTION - LOCATION: LOCATION: LEG

## 2024-02-16 NOTE — CARE COORDINATION
CTN reviewed patient history with Mitali RN Wound nurse. Mitali stated that this patient was independent with ostomy prior to this admission and has supplies. No home care needs at this time. Electronically signed by Linda Qiu LPN on 2/16/2024 at 11:07 AM

## 2024-02-16 NOTE — CARE COORDINATION
Case Management Assessment            Discharge Note                    Date / Time of Note: 2/16/2024 9:11 AM                  Discharge Note Completed by: SIRENA Cabrales, LSW    Patient Name: Guero Beckwith   YOB: 1975  Diagnosis: Severe anemia [D64.9]  Anemia [D64.9]   Date / Time: 2/10/2024 11:13 AM    Current PCP: Lydia Grider PA-C  Clinic patient: No    Hospitalization in the last 30 days: No       Advance Directives:  Code Status: Full Code  [place] DNR form completed and on chart: No    Financial:  [facility] [place] / Product Type: *No Product type* /      Pharmacy:    [address] [phone] -  [phone]  [address] [facility] [address]  Phone: [phone] Fax: [phone]      Assistance purchasing medications?: Potential Assistance Purchasing Medications: No  Assistance provided by Case Management: None at this time    Does patient want to participate in local refill/ meds to beds program?:      Meds To Beds General Rules:  1. Can ONLY be done Monday- Friday between 8:30am-5pm  2. Prescription(s) must be in pharmacy by 3pm to be filled same day  3.Copy of patient's insurance/ prescription drug card and patient face sheet must be sent along with the prescription(s)  4. Cost of Rx cannot be added to hospital bill. If financial assistance is needed, please contact unit  or ;  or  CANNOT provide pharmacy voucher for patients co-pays  5. Patients can then  the prescription on their way out of the hospital at discharge, or pharmacy can deliver to the bedside if staff is available. (payment due at time of pick-up or delivery - cash, check, or card accepted)     Able to afford home medications/ co-pay costs: Yes    ADLS:  Current PT AM-PAC Score:   /24  Current OT AM-PAC Score: 22 /24      DISCHARGE Disposition: Home with Home Health Care: [facility]     LOC at

## 2024-02-16 NOTE — PROGRESS NOTES
Pt discharged home via LYFT. Discharge teaching discussed and given to pt. Pt verbalized understanding, understands to  prescriptions, that she will receive home health care, and to follow up with plastic surgery. All belongings collected and given to pt. IV discontinued with no complications, minor swelling at site.

## 2024-02-16 NOTE — DISCHARGE INSTR - COC
Continuity of Care Form    Patient Name: Guero Beckwith   :  1975  MRN:  [id number]    Admit date:  2/10/2024  Discharge date:  2024    Code Status Order: Full Code   Advance Directives:   Advance Care Flowsheet Documentation       Date/Time Healthcare Directive Type of Healthcare Directive Copy in Chart Healthcare Agent Appointed Healthcare Agent's Name Healthcare Agent's Phone Number    02/15/24 1229 No, patient does not have an advance directive for healthcare treatment -- -- -- -- --            Admitting Physician:  Sunita Renee MD  PCP: Lydia Grider PA-C    Discharging Nurse:   Discharging Hospital Unit/Room#: 6302/6302-01  Discharging Unit Phone Number: [phone]    Emergency Contact:   Extended Emergency Contact Information  Primary Emergency Contact: HaimWendy caldera  Mobile Phone: [phone]  Relation: Child  Secondary Emergency Contact: Cristal Parker  Address: [address] [place] [address]  Mobile Phone: [phone]  Relation: Parent    Past Surgical History:  Past Surgical History:   Procedure Laterality Date    ABDOMEN SURGERY      colostomy present    APPENDECTOMY      COLONOSCOPY      DILATATION, ESOPHAGUS      LEG SURGERY Right 2024    RIGHT LEG WOUND DEBRIDEMENT performed by Jt Ma MD at [facility] OR    SKIN GRAFT Right 2/15/2024    SPLIT THICKNESS SKIN GRAFT RIGHT LOWER EXTREMITY performed by Tommy Whatley MD at [facility] OR    TONSILLECTOMY         Immunization History:   Immunization History   Administered Date(s) Administered    Influenza Virus Vaccine 2015    TDaP, ADACEL (age 10y-64y), BOOSTRIX (age 10y+), IM, 0.5mL 10/01/2019       Active Problems:  Patient Active Problem List   Diagnosis Code    Lithium toxicity T56.891A    Severe recurrent major depression without psychotic features (HCC) F33.2    Depression, major, recurrent, severe with psychosis (HCC) F33.3    Essential hypertension I10    Hypokalemia E87.6    Severe anemia D64.9     Assessment Score:  Readmission Risk              Risk of Unplanned Readmission:  16           Discharging to Facility/ Agency   Name:   Address:  Phone:  Fax:    Dialysis Facility (if applicable)   Name:  Address:  Dialysis Schedule:  Phone:  Fax:    / signature: {Esignature:722423033}    PHYSICIAN SECTION    Prognosis: {Prognosis:3794990282}    Condition at Discharge: { Patient Condition:627641853}    Rehab Potential (if transferring to Rehab): {Prognosis:2330474313}    Recommended Labs or Other Treatments After Discharge: ***    Physician Certification: I certify the above information and transfer of Guero Beckwith  is necessary for the continuing treatment of the diagnosis listed and that she requires {Admit to Appropriate Level of Care:72406} for {GREATER/LESS:285994303} 30 days.     Update Admission H&P: {CHP DME Changes in HandP:472412246}    PHYSICIAN SIGNATURE:  {Esignature:900878138}

## 2024-02-16 NOTE — PROGRESS NOTES
The pt was seen for an initial visit. The pt was supported spiritual through active listening and exploring her spirituality, and what is supportive to her. There are no further visits planned at this time, pt is hopeful for discharge.

## 2024-02-16 NOTE — PLAN OF CARE
Problem: Pain  Goal: Verbalizes/displays adequate comfort level or baseline comfort level  2/15/2024 2209 by Romario Klein RN  Outcome: Progressing  Flowsheets (Taken 2/15/2024 2209)  Verbalizes/displays adequate comfort level or baseline comfort level:   Encourage patient to monitor pain and request assistance   Administer analgesics based on type and severity of pain and evaluate response   Consider cultural and social influences on pain and pain management   Assess pain using appropriate pain scale   Implement non-pharmacological measures as appropriate and evaluate response   Notify Licensed Independent Practitioner if interventions unsuccessful or patient reports new pain  Note: Encourage to verbalize pain. PRN pain medication given as ordered.

## 2024-02-16 NOTE — PROGRESS NOTES
Plastic Surgery Daily Progress Note      CC: R calf wound    SUBJECTIVE:  Afebrile and hemodynamically stable. No acute events overnight. Pain is well controlled. Tolerating diet without nausea or vomiting. Able to ambulate after surgery.     ROS:   A 14 point review of systems was conducted, significant findings as noted above. All other systems negative.      OBJECTIVE:    PHYSICAL EXAM:  Vitals:    02/15/24 1510 02/15/24 1915 02/15/24 2014 02/16/24 0323   BP: 115/74 130/86  107/61   Pulse: 70 87  71   Resp: 16 16 16 17   Temp: 97.8 °F (36.6 °C) 98.9 °F (37.2 °C)  97.7 °F (36.5 °C)   TempSrc: Oral Oral  Oral   SpO2: 98% 98%  99%   Weight:       Height:           General appearance: Alert, no acute distress  HEENT: NC/AT, No scleral icterus, EOM grossly intact  Neck: Trachea midline, no JVD  Chest/Lungs: Normal effort with no accessory muscle use on RA  Cardiovascular: RRR, well perfused  Abdomen: Soft, non-tender, non-distended, no rebound, guarding, or rigidity.  Skin: Warm and dry, no rashes. Several wounds  Extremities: no cyanosis. Wound VAC on prevena setting with two check marks, minimal serous fluid in tubing, Donor site dressing with gauze with some serosanguinous staining, replaced with tegaderm this morning. Excoriations noted. Minimal RLE edema to foot  Neuro: A&Ox3, no focal deficits, sensation intact      ASSESSMENT & PLAN:   Guero Beckwith is a 48 y.o. female for whom our service has been consulted for closure of a right calf wound following excisional debridement in OR on 2/12, s/p STSG to R leg, application of wound VAC bolster dressing on 2/15    - Wound Vac switched to prevena bulb this morning   - Patient will need discharge with 7 days of PO Abx   - Will wrap R leg for edema and to protect Prevena prior to discharge  - Medical management per primary    Victorino Rodriguez DO  PGY-1, General Surgery Resident  02/16/24 6:13 AM  [phone]

## 2024-02-17 LAB
BACTERIA SPEC AEROBE CULT: ABNORMAL
BACTERIA SPEC AEROBE CULT: ABNORMAL
BACTERIA SPEC ANAEROBE CULT: ABNORMAL
GRAM STN SPEC: ABNORMAL
ORGANISM: ABNORMAL
ORGANISM: ABNORMAL

## 2024-02-17 NOTE — DISCHARGE SUMMARY
Discharge Summary    Name:  Guero Beckwith /Age/Sex: 1975  (48 y.o. female)   MRN & CSN:  [id number] & [id number] Admission Date/Time: 2/10/2024 11:13 AM   Attending:  No att. providers found Discharging Physician: Maritza Davidson MD       Discharge Diagnosis:  Right lower leg wound  Iron deficiency anemia   hypokalemia    hypomagnesemia  History of bipolar disorder  GERD  Tobacco abuse  Polysubstance abuse        Discharge Exam  Physical Exam  Vitals:    02/15/24 2014 02/16/24 0323 24 0557 24 0800   BP:  107/61  116/73   Pulse:  71  80   Resp: 16 17 17 16   Temp:  97.7 °F (36.5 °C)  97 °F (36.1 °C)   TempSrc:  Oral  Oral   SpO2:  99%  94%   Weight:       Height:        General: NAD  Eyes: EOMI  ENT: neck supple  Cardiovascular: Regular rate.  Respiratory: Clear to auscultation  Gastrointestinal: Soft, non tender, ostomy present  Genitourinary: no suprapubic tenderness  Musculoskeletal: No edema  Skin: Dressing with wound VAC present  Neuro: Alert.  Psych: Mood appropriate.        Hospital Course:   Guero Beckwith is a 48 y.o.  female  who presents with Severe anemia    Wound on the right lower leg-for skin grafting today  --S/p debridement on 2/10/2024.  Excision of skin subcutaneous tissue muscle and fascia with wound VAC placement done on 2024  -Surgery consulted appreciated  -Plastic surgery consult appreciated  -Unasyn day 5  -CT tibia shows ulcer with soft tissue swelling no involvement of bone  -wound c/s and biopsy sent from the OR-positive for Enterobacter   -Antibiotics changed to oral ciprofloxacin upon discharge  -Wound VAC switched to Prevena bulb in the morning of discharge.     Anemia-iron deficient  -Started on iron supplement        Hypokalemia  -Repleted     Hypomagnesemia  -Repleted     Ileostomy  -Local care     Past history of bipolar disorder  -At present not on medication  -Discussed with psychiatry.  Possibly start low-dose dose Seroquel and follow-up with the  [facility]     GERD  -On PPI     Tobacco abuse  -Patient states she smokes off-and-on, declines nicotine patch     Polysubstance abuse  - tox screen + for amphetamine    The patient expressed appropriate understanding of and agreement with the discharge recommendations, medications, and plan.     Consults this admission:  IP CONSULT TO GENERAL SURGERY  IP CONSULT TO PSYCHIATRY  IP CONSULT TO HOME CARE NEEDS      Discharge Instruction:   Handoff to PCP:     Follow up appointments: PCP, general surgery   Primary care physician:     Diet:  regular diet   Activity: activity as tolerated  Disposition: Discharged to:   [x]Home, [][facility], []SNF, []Acute Rehab, []Hospice   Condition on discharge: Stable    Discharge Medications:        Medication List        START taking these medications      ciprofloxacin 500 MG tablet  Commonly known as: CIPRO  Take 1 tablet by mouth 2 times daily for 7 days     ferrous sulfate 325 (65 Fe) MG tablet  Commonly known as: IRON 325  Take 1 tablet by mouth 2 times daily (with meals)     oxyCODONE 5 MG immediate release tablet  Commonly known as: ROXICODONE  Take 1 tablet by mouth every 6 hours as needed for Pain for up to 3 days. Max Daily Amount: 20 mg     QUEtiapine 50 MG tablet  Commonly known as: SEROQUEL  Take 1 tablet by mouth nightly            CONTINUE taking these medications      pantoprazole 20 MG tablet  Commonly known as: PROTONIX               Where to Get Your Medications        These medications were sent to [facility] - [address] [phone] - F [phone]  [address] [place] [address]      Phone: [phone]   ciprofloxacin 500 MG tablet  ferrous sulfate 325 (65 Fe) MG tablet  oxyCODONE 5 MG immediate release tablet  QUEtiapine 50 MG tablet         Objective Findings at Discharge:       BMP/CBC  Recent Labs     02/15/24  0555   *   K 4.3      CO2 27   BUN 12   CREATININE 0.8   WBC 6.4   HCT 28.3*

## 2024-07-22 ENCOUNTER — HOSPITAL ENCOUNTER (EMERGENCY)
Age: 49
Discharge: HOME | End: 2024-07-22
Payer: COMMERCIAL

## 2024-07-22 VITALS
TEMPERATURE: 98.3 F | SYSTOLIC BLOOD PRESSURE: 100 MMHG | BODY MASS INDEX: 30.82 KG/M2 | OXYGEN SATURATION: 100 % | HEIGHT: 65 IN | HEART RATE: 91 BPM | RESPIRATION RATE: 19 BRPM | WEIGHT: 185 LBS | DIASTOLIC BLOOD PRESSURE: 69 MMHG

## 2024-07-22 DIAGNOSIS — L98.9 SKIN LESIONS: Primary | ICD-10-CM

## 2024-07-22 LAB
ALBUMIN SERPL-MCNC: 3.9 G/DL (ref 3.4–5)
ALBUMIN/GLOB SERPL: 1.1 {RATIO} (ref 1.1–2.2)
ALP SERPL-CCNC: 136 U/L (ref 40–129)
ALT SERPL-CCNC: 7 U/L (ref 10–40)
ANION GAP SERPL CALCULATED.3IONS-SCNC: 16 MMOL/L (ref 3–16)
AST SERPL-CCNC: 17 U/L (ref 15–37)
BASOPHILS # BLD: 0.1 K/UL (ref 0–0.2)
BASOPHILS NFR BLD: 1.1 %
BILIRUB SERPL-MCNC: 0.3 MG/DL (ref 0–1)
BUN SERPL-MCNC: 17 MG/DL (ref 7–20)
CALCIUM SERPL-MCNC: 9.1 MG/DL (ref 8.3–10.6)
CHLORIDE SERPL-SCNC: 103 MMOL/L (ref 99–110)
CO2 SERPL-SCNC: 21 MMOL/L (ref 21–32)
CREAT SERPL-MCNC: 1 MG/DL (ref 0.6–1.1)
DEPRECATED RDW RBC AUTO: 14.9 % (ref 12.4–15.4)
EOSINOPHIL # BLD: 0.6 K/UL (ref 0–0.6)
EOSINOPHIL NFR BLD: 9 %
GFR SERPLBLD CREATININE-BSD FMLA CKD-EPI: 68 ML/MIN/{1.73_M2}
GLUCOSE SERPL-MCNC: 96 MG/DL (ref 70–99)
HCT VFR BLD AUTO: 27.6 % (ref 36–48)
HGB BLD-MCNC: 8.8 G/DL (ref 12–16)
LYMPHOCYTES # BLD: 2 K/UL (ref 1–5.1)
LYMPHOCYTES NFR BLD: 28.6 %
MAGNESIUM SERPL-MCNC: 1.9 MG/DL (ref 1.8–2.4)
MCH RBC QN AUTO: 24.2 PG (ref 26–34)
MCHC RBC AUTO-ENTMCNC: 32 G/DL (ref 31–36)
MCV RBC AUTO: 75.6 FL (ref 80–100)
MONOCYTES # BLD: 0.7 K/UL (ref 0–1.3)
MONOCYTES NFR BLD: 9.5 %
NEUTROPHILS # BLD: 3.7 K/UL (ref 1.7–7.7)
NEUTROPHILS NFR BLD: 51.8 %
PLATELET # BLD AUTO: 322 K/UL (ref 135–450)
PMV BLD AUTO: 6.9 FL (ref 5–10.5)
POTASSIUM SERPL-SCNC: 3.1 MMOL/L (ref 3.5–5.1)
PROT SERPL-MCNC: 7.4 G/DL (ref 6.4–8.2)
RBC # BLD AUTO: 3.65 M/UL (ref 4–5.2)
SODIUM SERPL-SCNC: 140 MMOL/L (ref 136–145)
WBC # BLD AUTO: 7.1 K/UL (ref 4–11)

## 2024-07-22 PROCEDURE — 6370000000 HC RX 637 (ALT 250 FOR IP): Performed by: NURSE PRACTITIONER

## 2024-07-22 PROCEDURE — 85025 COMPLETE CBC W/AUTO DIFF WBC: CPT

## 2024-07-22 PROCEDURE — 80053 COMPREHEN METABOLIC PANEL: CPT

## 2024-07-22 PROCEDURE — 36415 COLL VENOUS BLD VENIPUNCTURE: CPT

## 2024-07-22 PROCEDURE — 83735 ASSAY OF MAGNESIUM: CPT

## 2024-07-22 PROCEDURE — 99283 EMERGENCY DEPT VISIT LOW MDM: CPT

## 2024-07-22 RX ORDER — POTASSIUM CHLORIDE 20 MEQ/1
40 TABLET, EXTENDED RELEASE ORAL ONCE
Status: COMPLETED | OUTPATIENT
Start: 2024-07-22 | End: 2024-07-22

## 2024-07-22 RX ADMIN — POTASSIUM CHLORIDE 40 MEQ: 1500 TABLET, EXTENDED RELEASE ORAL at 18:33

## 2024-07-23 NOTE — ED PROVIDER NOTES
[facility]  ED  EMERGENCY DEPARTMENT ENCOUNTER        Pt Name: Guero Beckwith  MRN: [id number]  Birthdate 1975  Date of evaluation: 7/22/2024  Provider: TONY De Guzman - CNP  PCP: Lydia Grider PA-C  Note Started: 11:48 PM EDT 7/22/24      DREA. I have evaluated this patient.        CHIEF COMPLAINT       Chief Complaint   Patient presents with    Abscess     Pt has abscesses to bilateral upper thigh. Been on abx. Was seen at [place] recently, 8/10 pain       HISTORY OF PRESENT ILLNESS: 1 or more Elements     History From: the patient  Limitations to history : None    Guero Beckwith is a 48 y.o. female who presents to the ER with complaints of \"abscesses\" to bilateral thighs. States she has been on antibiotics. Patient states antibiotics havent helped. Here for further evaluation.    Nursing Notes were all reviewed and agreed with or any disagreements were addressed in the HPI.    REVIEW OF SYSTEMS :      Review of Systems    Positives and Pertinent negatives as per HPI.     SURGICAL HISTORY     Past Surgical History:   Procedure Laterality Date    ABDOMEN SURGERY      colostomy present    APPENDECTOMY      COLONOSCOPY      DILATATION, ESOPHAGUS      LEG SURGERY Right 2/12/2024    RIGHT LEG WOUND DEBRIDEMENT performed by Jt Ma MD at [facility] OR    SKIN GRAFT Right 2/15/2024    SPLIT THICKNESS SKIN GRAFT RIGHT LOWER EXTREMITY performed by Tommy Whatley MD at [facility] OR    TONSILLECTOMY         CURRENTMEDICATIONS       Discharge Medication List as of 7/22/2024  6:28 PM        CONTINUE these medications which have NOT CHANGED    Details   QUEtiapine (SEROQUEL) 50 MG tablet Take 1 tablet by mouth nightly, Disp-60 tablet, R-3Normal      ferrous sulfate (IRON 325) 325 (65 Fe) MG tablet Take 1 tablet by mouth 2 times daily (with meals), Disp-60 tablet, R-3Normal      pantoprazole (PROTONIX) 20 MG tablet Take 2 tablets by mouth dailyHistorical Med             ALLERGIES     Patient has no

## (undated) DEVICE — SPONGE,LAP,18"X18",DLX,XR,ST,5/PK,40/PK: Brand: MEDLINE

## (undated) DEVICE — TOWEL,STOP FLAG GOLD N-W: Brand: MEDLINE

## (undated) DEVICE — KIT NEG PRSS M DSG FOAM VAC VERAFLO

## (undated) DEVICE — DERMATOME BLADES: Brand: DERMATOME

## (undated) DEVICE — BLADE,CARBON-STEEL,10,STRL,DISPOSABLE,TB: Brand: MEDLINE

## (undated) DEVICE — AMD ANTIMICROBIAL BANDAGE ROLL,6 PLY: Brand: KERLIX

## (undated) DEVICE — PAD,NON-ADHERENT,3X8,STERILE,LF,1/PK: Brand: MEDLINE

## (undated) DEVICE — PREMIUM WET SKIN PREP TRAY: Brand: MEDLINE INDUSTRIES, INC.

## (undated) DEVICE — COVER LT HNDL BLU PLAS

## (undated) DEVICE — BANDAGE COMPR M W6INXL10YD WHT BGE VELC E MTRX HK AND LOOP

## (undated) DEVICE — SUTURE VCRL SZ 3-0 L18IN ABSRB UD POLYGLACTIN 910 BRAID TIE J910T

## (undated) DEVICE — GLOVE ORANGE PI 7 1/2   MSG9075

## (undated) DEVICE — SPONGE GZ W4XL8IN COT WVN 12 PLY

## (undated) DEVICE — BANDAGE COBAN 6 IN WND 6INX5YD FOAM

## (undated) DEVICE — LOWER EXTREMITY: Brand: MEDLINE INDUSTRIES, INC.

## (undated) DEVICE — BANDAGE COMPR W6INXL4.5YD LTWT E EC SGL LAYERED CLP CLSR

## (undated) DEVICE — HIGH FLOW TIP

## (undated) DEVICE — SUTURE CHROMIC GUT SZ 4-0 L27IN ABSRB BRN L17MM RB-1 1/2 U203H

## (undated) DEVICE — GLOVE SURG SZ 7 CRM LTX FREE POLYISOPRENE POLYMER BEAD ANTI

## (undated) DEVICE — 3M™ TEGADERM™ TRANSPARENT FILM DRESSING FRAME STYLE, 1628, 6 IN X 8 IN (15 CM X 20 CM), 10/CT 8CT/CASE: Brand: 3M™ TEGADERM™

## (undated) DEVICE — CASSETTE THER 38 MM W/ INSTILLATION TBNG VAC VERALINK

## (undated) DEVICE — 3M™ TEGADERM™ TRANSPARENT FILM DRESSING FRAME STYLE, 1629, 8 IN X 12 IN (20 CM X 30 CM), 10/CT 8CT/CASE: Brand: 3M™ TEGADERM™

## (undated) DEVICE — HANDPIECE SUCTION TUBING INTERPULSE 10FT

## (undated) DEVICE — SYRINGE MED 30ML STD CLR PLAS LUERLOCK TIP N CTRL DISP

## (undated) DEVICE — SOLUTION IRRIG 3000ML 0.9% SOD CHL USP UROMATIC PLAS CONT

## (undated) DEVICE — NEEDLE,22GX1.5",REG,BEVEL: Brand: MEDLINE

## (undated) DEVICE — DRESSING PETRO W3XL8IN OIL EMUL N ADH GZ KNIT IMPREG CELOS

## (undated) DEVICE — CONTAINER,SPECIMEN,PNEU TUBE,3OZ,OR STRL: Brand: MEDLINE

## (undated) DEVICE — 1.5 TO 1 DERMACARRIER: Brand: MESHGRAFTTM  II TISSUE EXPANSION SYSTEM

## (undated) DEVICE — BANDAGE COBAN 4 IN COMPR W4INXL5YD FOAM COHESIVE QUIK STK SELF ADH SFT

## (undated) DEVICE — SUTURE VCRL + SZ 3-0 L27IN ABSRB UD L26MM SH 1/2 CIR VCP416H

## (undated) DEVICE — PLASTIC MAJOR: Brand: MEDLINE INDUSTRIES, INC.

## (undated) DEVICE — INTENDED USE FOR SURGICAL MARKING ON INTACT SKIN, ALSO PROVIDES A PERMANENT METHOD OF IDENTIFYING OBJECTS IN THE OPERATING ROOM: Brand: WRITESITE® PLUS MINI PREP RESISTANT MARKER